# Patient Record
Sex: MALE | Race: WHITE | NOT HISPANIC OR LATINO | Employment: FULL TIME | URBAN - METROPOLITAN AREA
[De-identification: names, ages, dates, MRNs, and addresses within clinical notes are randomized per-mention and may not be internally consistent; named-entity substitution may affect disease eponyms.]

---

## 2017-02-13 DIAGNOSIS — F41.1 ANXIETY STATE: ICD-10-CM

## 2017-02-13 RX ORDER — LORAZEPAM 1 MG/1
1 TABLET ORAL EVERY 6 HOURS PRN
Qty: 60 TABLET | Refills: 1 | Status: SHIPPED | OUTPATIENT
Start: 2017-02-13 | End: 2017-04-11

## 2017-02-13 RX ORDER — CITALOPRAM HYDROBROMIDE 20 MG/1
20 TABLET ORAL DAILY
Qty: 30 TABLET | Refills: 5 | Status: SHIPPED | OUTPATIENT
Start: 2017-02-13 | End: 2017-05-05

## 2017-02-13 NOTE — TELEPHONE ENCOUNTER
CITALOPRAM 20MG     Last Written Prescription Date: 7/21/2016  Last Fill Quantity: 30, # refills: 4  Last Office Visit with [facility] primary care provider:  7/21/2016        Last PHQ-9 score on record=   PHQ-9 SCORE 7/21/2016   Total Score -   Total Score 5

## 2017-02-13 NOTE — TELEPHONE ENCOUNTER
LORAZEPAM 1MG      Last Written Prescription Date:  8/29/2016  Last Fill Quantity: 60,   # refills: 2  Last Office Visit with [facility], [facility] or  Health prescribing provider: 7/21/2016  Future Office visit:       Routing refill request to provider for review/approval because:  Drug not on the [facility], [facility] or  [facility] refill protocol or controlled substance

## 2017-02-13 NOTE — TELEPHONE ENCOUNTER
Patient takes this for anxiety, PHQ 9 not needed.   Prescription approved per [facility] Refill Protocol.  Zeina Mariano, RN  Triage Nurse

## 2017-02-13 NOTE — TELEPHONE ENCOUNTER
Rx refilled. In my outbasket.    Please call: He should schedule a f/u visit w/ me in the next 1-2 months.

## 2017-02-13 NOTE — TELEPHONE ENCOUNTER
Faxed rx to [facility] in [place] Fax# [phone]. Tried calling pt, number not in service.     Miesha Dias MA 2/13/2017 1:43 PM

## 2017-03-13 DIAGNOSIS — F51.01 PRIMARY INSOMNIA: ICD-10-CM

## 2017-03-13 RX ORDER — ZOLPIDEM TARTRATE 12.5 MG/1
12.5 TABLET, FILM COATED, EXTENDED RELEASE ORAL
Qty: 30 TABLET | Refills: 2 | Status: SHIPPED | OUTPATIENT
Start: 2017-03-13 | End: 2017-05-05

## 2017-03-13 NOTE — TELEPHONE ENCOUNTER
Script faxed to pharmacy.  Postponed until June. (due for OV in July per Provider)  Tyesha Jones LPN

## 2017-03-13 NOTE — TELEPHONE ENCOUNTER
zolpidem (AMBIEN CR) 12.5 MG CR tablet        Last Written Prescription Date: 12/12/16  Last Fill Quantity: 30,  # refills: 2   Last Office Visit with [facility], [facility] or [facility] prescribing provider: 07/21/16

## 2017-03-13 NOTE — LETTER
[address] [place] [address]  [phone]      June 13, 2017    Refugio Wood                                                                                                                                                       [id number] [facility] [address] [facility]   [place] [address]              Dear Refugio,    We have tried to reach you by phone with no success.  We have no current phone number for you.  It appears that you are due for an appointment.  Please call us at [phone] to schedule an appointment.      Sincerely,  Tay Hinojosa MD

## 2017-04-11 DIAGNOSIS — F41.1 ANXIETY STATE: ICD-10-CM

## 2017-04-12 RX ORDER — LORAZEPAM 1 MG/1
TABLET ORAL
Qty: 60 TABLET | Refills: 1 | Status: SHIPPED | OUTPATIENT
Start: 2017-04-12 | End: 2017-05-05

## 2017-04-12 NOTE — TELEPHONE ENCOUNTER
lorazepam      Last Written Prescription Date:  2-13-17  Last Fill Quantity: 60,   # refills: 1  Last Office Visit with [facility] or  Health prescribing provider: 7-21-16  Future Office visit:       Routing refill request to provider for review/approval because:  Drug not on the [facility], [facility] or  [facility] refill protocol or controlled substance  Tiffanie Barriga RN

## 2017-05-05 ENCOUNTER — OFFICE VISIT (OUTPATIENT)
Dept: PEDIATRICS | Facility: CLINIC | Age: 42
End: 2017-05-05
Payer: COMMERCIAL

## 2017-05-05 VITALS
SYSTOLIC BLOOD PRESSURE: 124 MMHG | OXYGEN SATURATION: 97 % | WEIGHT: 223 LBS | BODY MASS INDEX: 28.62 KG/M2 | DIASTOLIC BLOOD PRESSURE: 80 MMHG | TEMPERATURE: 98 F | HEART RATE: 70 BPM | HEIGHT: 74 IN

## 2017-05-05 DIAGNOSIS — F51.01 PRIMARY INSOMNIA: ICD-10-CM

## 2017-05-05 DIAGNOSIS — K58.0 IRRITABLE BOWEL SYNDROME WITH DIARRHEA: Primary | ICD-10-CM

## 2017-05-05 DIAGNOSIS — F41.1 ANXIETY STATE: ICD-10-CM

## 2017-05-05 PROCEDURE — 99214 OFFICE O/P EST MOD 30 MIN: CPT | Performed by: NURSE PRACTITIONER

## 2017-05-05 RX ORDER — CITALOPRAM HYDROBROMIDE 20 MG/1
20 TABLET ORAL DAILY
Qty: 90 TABLET | Refills: 3 | Status: SHIPPED | OUTPATIENT
Start: 2017-05-05 | End: 2017-11-29

## 2017-05-05 RX ORDER — ZOLPIDEM TARTRATE 12.5 MG/1
12.5 TABLET, FILM COATED, EXTENDED RELEASE ORAL
Qty: 30 TABLET | Refills: 5 | Status: SHIPPED | OUTPATIENT
Start: 2017-05-05 | End: 2017-06-09

## 2017-05-05 RX ORDER — LORAZEPAM 1 MG/1
1 TABLET ORAL EVERY 6 HOURS
Qty: 60 TABLET | Refills: 1 | Status: SHIPPED | OUTPATIENT
Start: 2017-05-05 | End: 2017-06-16

## 2017-05-05 RX ORDER — DIPHENOXYLATE HCL/ATROPINE 2.5-.025MG
1-2 TABLET ORAL 4 TIMES DAILY PRN
Qty: 40 TABLET | Refills: 5 | Status: SHIPPED | OUTPATIENT
Start: 2017-05-05 | End: 2017-12-23

## 2017-05-05 ASSESSMENT — ANXIETY QUESTIONNAIRES
2. NOT BEING ABLE TO STOP OR CONTROL WORRYING: SEVERAL DAYS
3. WORRYING TOO MUCH ABOUT DIFFERENT THINGS: SEVERAL DAYS
1. FEELING NERVOUS, ANXIOUS, OR ON EDGE: MORE THAN HALF THE DAYS
7. FEELING AFRAID AS IF SOMETHING AWFUL MIGHT HAPPEN: NOT AT ALL
6. BECOMING EASILY ANNOYED OR IRRITABLE: NOT AT ALL
GAD7 TOTAL SCORE: 7
IF YOU CHECKED OFF ANY PROBLEMS ON THIS QUESTIONNAIRE, HOW DIFFICULT HAVE THESE PROBLEMS MADE IT FOR YOU TO DO YOUR WORK, TAKE CARE OF THINGS AT HOME, OR GET ALONG WITH OTHER PEOPLE: SOMEWHAT DIFFICULT
5. BEING SO RESTLESS THAT IT IS HARD TO SIT STILL: SEVERAL DAYS

## 2017-05-05 ASSESSMENT — PATIENT HEALTH QUESTIONNAIRE - PHQ9: 5. POOR APPETITE OR OVEREATING: MORE THAN HALF THE DAYS

## 2017-05-05 NOTE — NURSING NOTE
"Chief Complaint   Patient presents with     Recheck Medication       Initial /80 (Cuff Size: Adult Regular)  Pulse 70  Temp 98  F (36.7  C) (Tympanic)  Ht 6' 2\" (1.88 m)  Wt 223 lb (101.2 kg)  SpO2 97%  BMI 28.63 kg/m2 Estimated body mass index is 28.63 kg/(m^2) as calculated from the following:    Height as of this encounter: 6' 2\" (1.88 m).    Weight as of this encounter: 223 lb (101.2 kg).  Medication Reconciliation: complete   Kiera Posada CMA    "

## 2017-05-05 NOTE — PROGRESS NOTES
"  SUBJECTIVE:                                                    Refugio Wood is a 41 year old male who presents to clinic today for the following health issues:    Medication Followup of Lorazepam. Citalopram, Ambien, Diphenoxylate-Atropine (Lomotil)    Taking Medication as prescribed: yes - some are as needed, would like ot start reducing/weaning off meds - would like to discuss    Side Effects:  None    Medication Helping Symptoms:  Yes - feels like he may need less     ROS: const/psych/gi otherwise negative     OBJECTIVE:  /80 (Cuff Size: Adult Regular)  Pulse 70  Temp 98  F (36.7  C) (Tympanic)  Ht 6' 2\" (1.88 m)  Wt 223 lb (101.2 kg)  SpO2 97%  BMI 28.63 kg/m2  CONSTITUTIONAL: Alert, well-nourished, well-groomed, NAD  RESP: Lungs CTA. No wheeze, rhonchi, rales.  CV: HRRR S1 S2 No MRG. No peripheral edema  PSYCH: Bright affect. Appropriate mentation and speech.       ASSESSMENT/PLAN:  (K58.0) Irritable bowel syndrome with diarrhea  (primary encounter diagnosis)  Comment: Unchanged. Takes about once per week.  Plan: diphenoxylate-atropine (LOMOTIL) 2.5-0.025 MG         per tablet            (F41.1) Anxiety state  Comment: Insomnia and anxiety related to insomnia. Daily anxiety is controlled. Discussed with patient that he has worse than your typical insomnia and that there are short and long term risks to ambien and lorazepam. Recommend he follow up with sleep therapist and sleep doc.   Plan: LORazepam (ATIVAN) 1 MG tablet, citalopram         (CELEXA) 20 MG tablet, SLEEP EVALUATION &         MANAGEMENT REFERRAL - ADULT        No further refills from me prior to seeing sleep doc.        Reinforced good sleep hygiene.     (F51.01) Primary insomnia  Plan: zolpidem (AMBIEN CR) 12.5 MG CR tablet, SLEEP         EVALUATION & MANAGEMENT REFERRAL - ADULT              Alessandra Leonardo, KEVINP-DNP.        "

## 2017-05-05 NOTE — MR AVS SNAPSHOT
After Visit Summary   5/5/2017    Refugio Wood    MRN: [id number]           Patient Information     Date Of Birth          1975        Visit Information        Provider Department      5/5/2017 12:00 PM Alessandra Leonardo APRN CNP [facility]        Today's Diagnoses     Irritable bowel syndrome with diarrhea    -  1    Anxiety state        Primary insomnia          Care Instructions    1. Sleep doc [phone]        Follow-ups after your visit        Additional Services     SLEEP EVALUATION & MANAGEMENT REFERRAL - ADULT       Please be aware that coverage of these services is subject to the terms and limitations of your health insurance plan.  Call member services at your health plan with any benefit or coverage questions.    [phone]    Please bring the following to your appointment:    >>   List of current medications   >>   This referral request   >>   Any documents/labs given to you for this referral    [facility] Neurology [facility] [phone]                  Future tests that were ordered for you today     Open Future Orders        Priority Expected Expires Ordered    SLEEP EVALUATION & MANAGEMENT REFERRAL - ADULT Routine  5/5/2018 5/5/2017            Who to contact     If you have questions or need follow up information about today's clinic visit or your schedule please [facility] directly at [phone].  Normal or non-critical lab and imaging results will be communicated to you by MyChart, letter or phone within 4 business days after the clinic has received the results. If you do not hear from us within 7 days, please contact the clinic through MyChart or phone. If you have a critical or abnormal lab result, we will notify you by phone as soon as possible.  Submit refill requests through [employer] or call your pharmacy and they will forward the refill request to us. Please allow 3 business days for your refill to be  "completed.          Additional Information About Your Visit        [email or web address] Information     [employer] lets you send messages to your doctor, view your test results, renew your prescriptions, schedule appointments and more. To sign up, go to [email or web address] . Click on \"Log in\" on the left side of the screen, which will take you to the Welcome page. Then click on \"Sign up Now\" on the right side of the page.     You will be asked to enter the access code listed below, as well as some personal information. Please follow the directions to create your username and password.     Your access code is: [email or web address]  Expires: 8/3/2017 12:28 PM     Your access code will  in 90 days. If you need help or a new code, please call your [place] clinic or [phone].        Care EveryWhere ID     This is your Care EveryWhere ID. This could be used by other organizations to access your [place] medical records  [id number]        Your Vitals Were     Pulse Temperature Height Pulse Oximetry BMI (Body Mass Index)       70 98  F (36.7  C) (Tympanic) 6' 2\" (1.88 m) 97% 28.63 kg/m2        Blood Pressure from Last 3 Encounters:   17 124/80   16 112/68   16 122/78    Weight from Last 3 Encounters:   17 223 lb (101.2 kg)   16 225 lb (102.1 kg)   16 228 lb (103.4 kg)                 Today's Medication Changes          These changes are accurate as of: 17 12:28 PM.  If you have any questions, ask your nurse or doctor.               These medicines have changed or have updated prescriptions.        Dose/Directions    diphenoxylate-atropine 2.5-0.025 MG per tablet   Commonly known as:  LOMOTIL   This may have changed:    - medication strength  - how much to take  - when to take this  - reasons to take this   Used for:  Irritable bowel syndrome with diarrhea   Changed by:  Alessandra Leonardo APRN CNP        Dose:  1-2 tablet   Take 1-2 tablets by mouth 4 times daily as needed   Quantity:  " 40 tablet   Refills:  5       * LORazepam 1 MG tablet   Commonly known as:  ATIVAN   This may have changed:  Another medication with the same name was changed. Make sure you understand how and when to take each.   Used for:  Anxiety state   Changed by:  Uzair Watson MD        Dose:  1 mg   Take 1 tablet (1 mg) by mouth nightly as needed for anxiety   Quantity:  30 tablet   Refills:  2       * LORazepam 1 MG tablet   Commonly known as:  ATIVAN   This may have changed:  See the new instructions.   Used for:  Anxiety state   Changed by:  Alessandra Leonardo APRN CNP        Dose:  1 mg   Take 1 tablet (1 mg) by mouth every 6 hours   Quantity:  60 tablet   Refills:  1       * Notice:  This list has 2 medication(s) that are the same as other medications prescribed for you. Read the directions carefully, and ask your doctor or other care provider to review them with you.         Where to get your medicines      These medications were sent to [facility] [address] [place] [address] AT [facility] [address] [address]     Phone:  [phone]     citalopram 20 MG tablet         Some of these will need a paper prescription and others can be bought over the counter.  Ask your nurse if you have questions.     Bring a paper prescription for each of these medications     diphenoxylate-atropine 2.5-0.025 MG per tablet    LORazepam 1 MG tablet    zolpidem 12.5 MG CR tablet                Primary Care Provider Office Phone # Fax #    Tay Hinojosa [phone]       [address] [place] [address]        Thank you!     Thank you for choosing [facility]  for your care. Our goal is always to provide you with excellent care. Hearing back from our patients is one way we can continue to improve our services. Please take a few minutes to complete the written survey that you may receive in the mail after your  visit with us. Thank you!             Your Updated Medication List - Protect others around you: Learn how to safely use, store and throw away your medicines at www.disposemymeds.org.          This list is accurate as of: 5/5/17 12:28 PM.  Always use your most recent med list.                   Brand Name Dispense Instructions for use    citalopram 20 MG tablet    celeXA    90 tablet    Take 1 tablet (20 mg) by mouth daily Take 1/2 tablet (10 mg) for 1-2 weeks, then increase to 1 tablet orally daily       diphenoxylate-atropine 2.5-0.025 MG per tablet    LOMOTIL    40 tablet    Take 1-2 tablets by mouth 4 times daily as needed       * LORazepam 1 MG tablet    ATIVAN    30 tablet    Take 1 tablet (1 mg) by mouth nightly as needed for anxiety       * LORazepam 1 MG tablet    ATIVAN    60 tablet    Take 1 tablet (1 mg) by mouth every 6 hours       zolpidem 12.5 MG CR tablet    AMBIEN CR    30 tablet    Take 1 tablet (12.5 mg) by mouth nightly as needed for sleep       * Notice:  This list has 2 medication(s) that are the same as other medications prescribed for you. Read the directions carefully, and ask your doctor or other care provider to review them with you.

## 2017-05-06 ASSESSMENT — ANXIETY QUESTIONNAIRES: GAD7 TOTAL SCORE: 7

## 2017-05-06 ASSESSMENT — PATIENT HEALTH QUESTIONNAIRE - PHQ9: SUM OF ALL RESPONSES TO PHQ QUESTIONS 1-9: 8

## 2017-05-24 ENCOUNTER — TELEPHONE (OUTPATIENT)
Dept: PEDIATRICS | Facility: CLINIC | Age: 42
End: 2017-05-24

## 2017-05-24 NOTE — TELEPHONE ENCOUNTER
I helped schedule this patient with the [facility] but I noticed his order mentioned going to the neurology clinic. I brought this up to the patient and he wasn't sure but considering it was a sleep evaluation order we still scheduled him. Please call him to verify if he was supposed to be seeing a neurologist or a sleep doctor.     Thank you,  Aylin KATE   Central Scheduler

## 2017-05-25 ENCOUNTER — TELEPHONE (OUTPATIENT)
Dept: PEDIATRICS | Facility: CLINIC | Age: 42
End: 2017-05-25

## 2017-05-25 NOTE — TELEPHONE ENCOUNTER
This writer only see notes for sleep evaluation per last OV notes on 5/5. Route to station B for further.     //Susan Blair MA// May 25, 2017 9:37 AM

## 2017-05-25 NOTE — TELEPHONE ENCOUNTER
Pt. Informed of note below. No further questions at this time as he has an appointment already made for the sleep clinic.     Michelle Skinner RN, BSN, PHN

## 2017-06-08 DIAGNOSIS — F51.01 PRIMARY INSOMNIA: ICD-10-CM

## 2017-06-09 DIAGNOSIS — F51.01 PRIMARY INSOMNIA: ICD-10-CM

## 2017-06-09 RX ORDER — ZOLPIDEM TARTRATE 12.5 MG/1
12.5 TABLET, FILM COATED, EXTENDED RELEASE ORAL
Qty: 30 TABLET | Refills: 5 | Status: SHIPPED | OUTPATIENT
Start: 2017-06-09 | End: 2017-11-29

## 2017-06-09 RX ORDER — ZOLPIDEM TARTRATE 12.5 MG/1
TABLET, FILM COATED, EXTENDED RELEASE ORAL
Qty: 30 TABLET | Refills: 0 | OUTPATIENT
Start: 2017-06-09

## 2017-06-09 NOTE — TELEPHONE ENCOUNTER
Written 6/9/17    Disp Refills Start End MICHELLE   zolpidem (AMBIEN CR) 12.5 MG CR tablet 30 tablet 5 6/9/2017

## 2017-06-12 RX ORDER — ZOLPIDEM TARTRATE 12.5 MG/1
TABLET, FILM COATED, EXTENDED RELEASE ORAL
Qty: 30 TABLET | Refills: 0 | COMMUNITY
Start: 2017-06-12

## 2017-06-12 NOTE — TELEPHONE ENCOUNTER
No refills authorized.   Patient needs appointment with sleep doc for further evaluation.   Reynaldo Lauren PA-C

## 2017-06-13 NOTE — TELEPHONE ENCOUNTER
We do not have any current phone number for pt. Letter mailed to pt to call us to schedule an appointment.Zeina Lawton LPN

## 2017-07-08 DIAGNOSIS — F41.1 ANXIETY STATE: ICD-10-CM

## 2017-07-09 NOTE — TELEPHONE ENCOUNTER
LORazepam (ATIVAN) 1 MG tablet      Last Written Prescription Date:  06/16/2017  Last Fill Quantity: 60,   # refills: 0  Last Office Visit with [facility] or [facility] prescribing provider: 05/05/2017  Future Office visit:       Routing refill request to provider for review/approval because:  Drug not on the [facility], [facility] or [facility] refill protocol or controlled substance

## 2017-07-12 ENCOUNTER — OFFICE VISIT (OUTPATIENT)
Dept: SLEEP MEDICINE | Facility: CLINIC | Age: 42
End: 2017-07-12
Payer: COMMERCIAL

## 2017-07-12 VITALS
HEART RATE: 60 BPM | SYSTOLIC BLOOD PRESSURE: 121 MMHG | OXYGEN SATURATION: 99 % | BODY MASS INDEX: 28.25 KG/M2 | HEIGHT: 75 IN | WEIGHT: 227.2 LBS | DIASTOLIC BLOOD PRESSURE: 76 MMHG

## 2017-07-12 DIAGNOSIS — F51.04 PSYCHOPHYSIOLOGIC INSOMNIA: ICD-10-CM

## 2017-07-12 DIAGNOSIS — F41.1 ANXIETY STATE: ICD-10-CM

## 2017-07-12 DIAGNOSIS — G25.81 RESTLESS LEG: ICD-10-CM

## 2017-07-12 DIAGNOSIS — G47.21 DELAYED SLEEP PHASE SYNDROME: Primary | ICD-10-CM

## 2017-07-12 PROCEDURE — 99204 OFFICE O/P NEW MOD 45 MIN: CPT | Performed by: PHYSICIAN ASSISTANT

## 2017-07-12 RX ORDER — GABAPENTIN 100 MG/1
CAPSULE ORAL
Qty: 90 CAPSULE | Refills: 1 | Status: SHIPPED | OUTPATIENT
Start: 2017-07-12 | End: 2017-11-29

## 2017-07-12 RX ORDER — LORAZEPAM 1 MG/1
TABLET ORAL
Qty: 60 TABLET | Refills: 1 | Status: SHIPPED | OUTPATIENT
Start: 2017-07-12 | End: 2017-09-05

## 2017-07-12 NOTE — NURSING NOTE
"Chief Complaint   Patient presents with     Sleep Problem     sleeping issues       Initial /76  Pulse 60  Ht 1.905 m (6' 3\")  Wt 103.1 kg (227 lb 3.2 oz)  SpO2 99%  BMI 28.4 kg/m2 Estimated body mass index is 28.4 kg/(m^2) as calculated from the following:    Height as of this encounter: 1.905 m (6' 3\").    Weight as of this encounter: 103.1 kg (227 lb 3.2 oz).  Medication Reconciliation: complete   Neck 43cm  ESS 0/24  Jennyfer Franco MA      "

## 2017-07-12 NOTE — PATIENT INSTRUCTIONS
Instructions for treating Delayed Sleep Phase Syndrome:    Delayed Sleep Phase Syndrome (DSPS) means that your body's internal timing is set late compared to the 24 hour day. Therefore, it is often difficult to get up on time for work in the morning and sometimes difficult to fall asleep on time, in order to get enough sleep. People with DSPS often tend to like to stay up late on weekends and sleep in until between 10 AM and noon, sometimes even later.This is actually a bad habit that will perpetuate the problem. It reinforces your body's tendency to be on that later schedule.    You should go to bed when you are sleepy and ready to sleep. During this entire process, you should not engage in activities that may make it worse, such as watching TV in bed, leaving the TV on all night, drinking any caffeine 6 hours before bed or exercising 1-2 hours before bed.     Start taking Melatonin, 1 mg tablet 3-5 hours before the time that you fall asleep on average (not your desired bedtime or time that you get in bed, but the time you normally fall asleep on your own).     Upon awakening, get exposure to sun-light for about 30-45 minutes. You do not need to look at the sun, in fact, this is dangerous. Reading the paper with the sun shining on you is adequate.  Alternatively, you may use a Seasonal Affective Disorder Lamp (intensity 10,000 Lux) instead of the sun. The lamp should be positioned 1-2 arms lengths away from you. They lamps are sold at Home [employer] Companies such as [employer] or [employer]. A prescription can be written to get insurance coverage in some cases. They are also sold on [employer].    Using the light and melatonin should help march your internal clock (known as your circadian rhythms) gradually earlier. As your bedtime advances, remember to take your melatonin earlier, keeping it 5 hours before your fall asleep time.    Avoid naps and sleeping in because sleeping during the day will delay  your body's clock and you will have to start from scratch.     More information about light therapy:  If the cost of any light box is too much, you can also purchase a compact fluorescent all spectrum light bulb at a local hardware store for about $8.  The most commonly available bulb is 1400 lumen.  You would need two of these positioned within 1 meter of yourself to be equivalent to 2,500 lux.  The bulbs can be placed in a standard light fixture.  Additionally, they can be placed in a mountable fixture that is used in greenhouses.  Mountable fixtures are also available at hardware stores for about $9.  Do not look directly at the light.  If you have any concerns regarding the safety of bright light therapy for you, it is recommended that you consult an ophthalmologist before using a light box.  If you have a condition that makes your eyes very sensitive to light, macular degeneration, a family history of such problems, or diabetic changes to your eyes, consult an ophthalmologist before using a light box. If you have anxiety disorder and have an increase in anxiety discontinue use.     General recommendations for sleep problems (Insomnia)  Allow 2-4 weeks to see results     Establish a regular sleep schedule    Most people only need 7-8 hours of sleep.  Don't be in bed longer than you need     to sleep or you will end up spending more time awake in bed. This trains your    brain to think of the bed as a place to not sleep.  Go to bed at same time each night   Get up at same time each day - Set an alarm everyday (even weekends). This is one of    the most important tips. It prevents you from relying on your insomnia to get you    up on time for your day. That actually reinforces insomnia. It also will help your    body get into a pattern where you start feeling tired at a consistent time each    night.  The body functions best when you keep a consistent routine.  Avoid sleeping-in and napping. Anytime you sleep  during the day, you will be less tired at    night. You may be tired enough to fall asleep, but you will wake more in the    middle of the night because you will have met your sleep need before the night is    done.   Cut down time in bed (if not asleep, get up)- Use your bed only for sleep and sex    Anytime you spend time in bed doing activities other than sleep (reading,    watching TV, working, playing on the computer or phone, or even just laying in    bed trying to sleep), you are training  your brain to think of the bed as a place to    do activities other than sleep. If you are not falling asleep within 20-30 minutes,    get out of bed. While out of bed, avoid bright lights. Avoid work or chores. Being    productive in the middle of the night reinforces waking up at night. Find relaxing,    not particularly entertaining activities like reading, listening to music, or relaxation    exercises. Go back to bed if you start feeling groggy, or after about 30 minutes,    even if not feeling very tired. Sometimes, just getting out of bed stops the pattern    of getting frustrated about laying in bed not sleeping, and that can help you fall    asleep.   Avoid trying to force yourself to sleep- sleep is not like everything else. The harder you    work at most things, the more you can accomplish. The harder you work at    sleep, the less you will sleep.     Make the bedroom comfortable - quiet, dark and cool are better. Consider ear plugs    (silicon). Use dark blinds or wear an eye mask if needed     Make a relaxing routine prior to bedtime  Relaxation exercises:   Progressive muscle relaxation: Relax each muscle group individually    Begin with your feet, flex, then relax. Try to imagine your feet feeling heavy and sinking into the bed. Move to your calves, do the same thing. Work through each muscle group toward your head.    Relaxing Mental Imagery: Try to imagine a trip that you took and found relaxing, or imagine  a day at the beach. Try to walk yourself through the day in your mind as if you were dreaming it. Try to imagine sensing the different experiences, such as feeling sand between your toes, the heat of the sun on your skin, seeing the waves crashing the shore, the smell of the salt water, etc.     Deal with your worries before bedtime    Set aside a worry time around dinner time for 10-15 minutes. Write down the    things that are on your mind. Plan time in the coming days to address those    issues. Brainstorm ideas on how you will deal with them. Try to identify issues    that are out of your control, and try to let those issues go.  Listen to relaxation tapes   Classical Music or Nature sounds   Back Massage   Get regular exercise each day (at least 1-2 hours before bedtime)   Take medications only as directed   Eat a light bedtime snack or warm drink   Warm milk   Warm herbal tea (non-caffeinated)       Things to avoid   No overstimulating activities just before bed   No competitive games before bedtime   No exciting television programs before bedtime   Avoid caffeine after lunchtime   Avoid chocolate   Do not use alcohol to induce sleep (worsens Insomnia)   Do not take someone else's sleeping pills   Do not look at the clock when awakening   Do not turn on light when getting up to use bathroom, use a nightlight     Online Programs     www.SHUTi.me (pronounced shut eye). There is a fee for this program. Enter the code  [place]  if you decide to enroll in this program.      www.sleepIO.com (pronounced sleep ee oh). There is a fee for this program. Enter the code  [place]  if you decide to enroll in this program.     Suggested Resources  Insomnia Treatment Books     Overcoming Insomnia by Matthew Stone and Valeria Chappell (2008)    No More Sleepless Nights by John Jessica and Myranda Nunes (1996)    Say [place] to Insomnia by Beny Dowling (2009)    The Insomnia Workbook by Nettie Arredondo and Hussain  Lois (2009)    The Insomnia Answer by Rosalino Victor and Shakir Weston (2006)      Stress Management and Relaxation Books    The Relaxation and Stress Reduction Workbook by Marilyn Renner, Pippa Wilson and Anish Vincent (2008)    Stress Management Workbook: Techniques and Self-Assessment Procedures by Leigha Marsh and Jabari Quintero (1997)    A Mindfulness-Based Stress Reduction Workbook by Santana Mckeon and Caryn Mayes (2010)    The Complete Stress Management Workbook by Vinny Jolley, Ramon Rodriguez and Pierre Gallardo (1996)    Assert Yourself by Melissa Isaac and Jaylen Isaac (1977)    Relaxation Resources for Computer Download   These websites offer resources to help you relax. This list is for information only. [facility] is not responsible for the quality of services or the actions of any person or organization.  Progressive Muscle Relaxation (PMR):     http://www.[facility]/progressive-muscle-relaxation-exercise.html     http://studentsupport.[email or web address]/counseling/resources/[email or web address]/relaxation-and-stress-management/   Deep Breathing Exercises:    http://www.[facility]/breathing-awareness.html     Meditation:     www.[employer]    www.[employer]itation-site.com You may have to pay for some of these resources.    Guided Imagery:    http://www.[facility]/guided-imagery-scripts.html     http://[employer]/library/tavljuzngd-lokozc-qrezbyz/     Counseling / Behavioral Health  [facility]  Visit [email or web address] or call [phone] to find a clinic close to you.  Or call [phone] for [facility].

## 2017-07-12 NOTE — MR AVS SNAPSHOT
After Visit Summary   7/12/2017    Refugio Wood    MRN: [id number]           Patient Information     Date Of Birth          1975        Visit Information        Provider Department      7/12/2017 11:00 AM Goltz, Bennett Ezra, PA-C [facility]        Today's Diagnoses     Delayed sleep phase syndrome    -  1    Restless leg        Psychophysiologic insomnia        Anxiety state          Care Instructions    Instructions for treating Delayed Sleep Phase Syndrome:    Delayed Sleep Phase Syndrome (DSPS) means that your body's internal timing is set late compared to the 24 hour day. Therefore, it is often difficult to get up on time for work in the morning and sometimes difficult to fall asleep on time, in order to get enough sleep. People with DSPS often tend to like to stay up late on weekends and sleep in until between 10 AM and noon, sometimes even later.This is actually a bad habit that will perpetuate the problem. It reinforces your body's tendency to be on that later schedule.    You should go to bed when you are sleepy and ready to sleep. During this entire process, you should not engage in activities that may make it worse, such as watching TV in bed, leaving the TV on all night, drinking any caffeine 6 hours before bed or exercising 1-2 hours before bed.     Start taking Melatonin, 1 mg tablet 3-5 hours before the time that you fall asleep on average (not your desired bedtime or time that you get in bed, but the time you normally fall asleep on your own).     Upon awakening, get exposure to sun-light for about 30-45 minutes. You do not need to look at the sun, in fact, this is dangerous. Reading the paper with the sun shining on you is adequate.  Alternatively, you may use a Seasonal Affective Disorder Lamp (intensity 10,000 Lux) instead of the sun. The lamp should be positioned 1-2 arms lengths away from you. They lamps are sold at [facility] [employer] such as [facility],  [employer] or [facility]. A prescription can be written to get insurance coverage in some cases. They are also sold on [employer].com.    Using the light and melatonin should help march your internal clock (known as your circadian rhythms) gradually earlier. As your bedtime advances, remember to take your melatonin earlier, keeping it 5 hours before your fall asleep time.    Avoid naps and sleeping in because sleeping during the day will delay your body's clock and you will have to start from scratch.     More information about light therapy:  If the cost of any light box is too much, you can also purchase a compact fluorescent all spectrum light bulb at a local hardware store for about $8.  The most commonly available bulb is 1400 lumen.  You would need two of these positioned within 1 meter of yourself to be equivalent to 2,500 lux.  The bulbs can be placed in a standard light fixture.  Additionally, they can be placed in a mountable fixture that is used in greenhouses.  Mountable fixtures are also available at hardware stores for about $9.  Do not look directly at the light.  If you have any concerns regarding the safety of bright light therapy for you, it is recommended that you consult an ophthalmologist before using a light box.  If you have a condition that makes your eyes very sensitive to light, macular degeneration, a family history of such problems, or diabetic changes to your eyes, consult an ophthalmologist before using a light box. If you have anxiety disorder and have an increase in anxiety discontinue use.     General recommendations for sleep problems (Insomnia)  Allow 2-4 weeks to see results     Establish a regular sleep schedule    Most people only need 7-8 hours of sleep.  Don't be in bed longer than you need     to sleep or you will end up spending more time awake in bed. This trains your    brain to think of the bed as a place to not sleep.  Go to bed at same time each night   Get up at same  time each day - Set an alarm everyday (even weekends). This is one of    the most important tips. It prevents you from relying on your insomnia to get you    up on time for your day. That actually reinforces insomnia. It also will help your    body get into a pattern where you start feeling tired at a consistent time each    night.  The body functions best when you keep a consistent routine.  Avoid sleeping-in and napping. Anytime you sleep during the day, you will be less tired at    night. You may be tired enough to fall asleep, but you will wake more in the    middle of the night because you will have met your sleep need before the night is    done.   Cut down time in bed (if not asleep, get up)- Use your bed only for sleep and sex    Anytime you spend time in bed doing activities other than sleep (reading,    watching TV, working, playing on the computer or phone, or even just laying in    bed trying to sleep), you are training  your brain to think of the bed as a place to    do activities other than sleep. If you are not falling asleep within 20-30 minutes,    get out of bed. While out of bed, avoid bright lights. Avoid work or chores. Being    productive in the middle of the night reinforces waking up at night. Find relaxing,    not particularly entertaining activities like reading, listening to music, or relaxation    exercises. Go back to bed if you start feeling groggy, or after about 30 minutes,    even if not feeling very tired. Sometimes, just getting out of bed stops the pattern    of getting frustrated about laying in bed not sleeping, and that can help you fall    asleep.   Avoid trying to force yourself to sleep- sleep is not like everything else. The harder you    work at most things, the more you can accomplish. The harder you work at    sleep, the less you will sleep.     Make the bedroom comfortable - quiet, dark and cool are better. Consider ear plugs    (silicon). Use dark blinds or wear an eye  mask if needed     Make a relaxing routine prior to bedtime  Relaxation exercises:   Progressive muscle relaxation: Relax each muscle group individually    Begin with your feet, flex, then relax. Try to imagine your feet feeling heavy and sinking into the bed. Move to your calves, do the same thing. Work through each muscle group toward your head.    Relaxing Mental Imagery: Try to imagine a trip that you took and found relaxing, or imagine a day at the beach. Try to walk yourself through the day in your mind as if you were dreaming it. Try to imagine sensing the different experiences, such as feeling sand between your toes, the heat of the sun on your skin, seeing the waves crashing the shore, the smell of the salt water, etc.     Deal with your worries before bedtime    Set aside a worry time around dinner time for 10-15 minutes. Write down the    things that are on your mind. Plan time in the coming days to address those    issues. Brainstorm ideas on how you will deal with them. Try to identify issues    that are out of your control, and try to let those issues go.  Listen to relaxation tapes   Classical Music or Nature sounds   Back Massage   Get regular exercise each day (at least 1-2 hours before bedtime)   Take medications only as directed   Eat a light bedtime snack or warm drink   Warm milk   Warm herbal tea (non-caffeinated)       Things to avoid   No overstimulating activities just before bed   No competitive games before bedtime   No exciting television programs before bedtime   Avoid caffeine after lunchtime   Avoid chocolate   Do not use alcohol to induce sleep (worsens Insomnia)   Do not take someone else's sleeping pills   Do not look at the clock when awakening   Do not turn on light when getting up to use bathroom, use a nightlight     Online Programs     www.SHUTi.me (pronounced shut eye). There is a fee for this program. Enter the code  [employer]  if you decide to enroll in this  program.      www.sleepIO.com (pronounced sleep ee oh). There is a fee for this program. Enter the code  [facility]  if you decide to enroll in this program.     Suggested Resources  Insomnia Treatment Books     Overcoming Insomnia by Matthew Stone and Valeria Chappell (2008)    No More Sleepless Nights by John Jessica and Myranda Nunes (1996)    Say Lambert to Insomnia by Beny Dowling (2009)    The Insomnia Workbook by Ntetie Arredondo and Hussain Abreu (2009)    The Insomnia Answer by Rosalino Victor and Shakir Weston (2006)      Stress Management and Relaxation Books    The Relaxation and Stress Reduction Workbook by Marilyn Renner, Pippa Wilson and Anish Vincent (2008)    Stress Management Workbook: Techniques and Self-Assessment Procedures by Leigha Marsh and Jabari Quintero (1997)    A Mindfulness-Based Stress Reduction Workbook by Santana Mckeon and Caryn Mayes (2010)    The Complete Stress Management Workbook by Vinny Jolley, Ramon Rodriguez and Pierre Gallardo (1996)    Assert Yourself by Melissa Isaac and Jaylen Isaac (1977)    Relaxation Resources for Computer Download   These websites offer resources to help you relax. This list is for information only. [facility] is not responsible for the quality of services or the actions of any person or organization.  Progressive Muscle Relaxation (PMR):     http://www.[employer]/progressive-muscle-relaxation-exercise.html     http://studentsupport.[facility]/counseling/resources/self-help/relaxation-and-stress-management/   Deep Breathing Exercises:    http://www.[employer]/breathing-awareness.html     Meditation:     www.[employer]    www.theISISguided-meditation-site.com You may have to pay for some of these resources.    Guided Imagery:    http://www.[employer]/guided-imagery-scripts.html     http://[employer]/library/atvvdwmupy-dueezs-cgmzbdt/     Counseling / Behavioral  Health  [facility]  Visit [email or web address] or call [phone] to find a clinic close to you.  Or call [phone] for [facility].              Follow-ups after your visit        Follow-up notes from your care team     Return in about 1 month (around 8/12/2017) for Circadian rhythm management.      Your next 10 appointments already scheduled     Aug 09, 2017  1:00 PM CDT   Return Sleep Patient with Bennett Ezra Goltz, PA-C   [facility] ([facility])    [address] [place] [id number] [phone]              Who to contact     If you have questions or need follow up information about today's clinic visit or your schedule please contact [facility] directly at [phone].  Normal or non-critical lab and imaging results will be communicated to you by MyChart, letter or phone within 4 business days after the clinic has received the results. If you do not hear from us within 7 days, please contact the clinic through [employer] or phone. If you have a critical or abnormal lab result, we will notify you by phone as soon as possible.  Submit refill requests through [employer] or call your pharmacy and they will forward the refill request to us. Please allow 3 business days for your refill to be completed.          Additional Information About Your Visit        MyChart Information     [facility] gives you secure access to your electronic health record. If you see a primary care provider, you can also send messages to your care team and make appointments. If you have questions, please call your primary care clinic.  If you do not have a primary care provider, please call [phone] and they will assist you.        Care EveryWhere ID     This is your Care EveryWhere ID. This could be used by other organizations to access your [facility] medical records  [id number]        Your Vitals Were     Pulse Height  "Pulse Oximetry BMI (Body Mass Index)          60 1.905 m (6' 3\") 99% 28.4 kg/m2         Blood Pressure from Last 3 Encounters:   07/12/17 121/76   05/05/17 124/80   07/21/16 112/68    Weight from Last 3 Encounters:   07/12/17 103.1 kg (227 lb 3.2 oz)   05/05/17 101.2 kg (223 lb)   07/21/16 102.1 kg (225 lb)              Today, you had the following     No orders found for display         Today's Medication Changes          These changes are accurate as of: 7/12/17 12:34 PM.  If you have any questions, ask your nurse or doctor.               Start taking these medicines.        Dose/Directions    gabapentin 100 MG capsule   Commonly known as:  NEURONTIN   Used for:  Restless leg   Started by:  Goltz, Bennett Ezra, PA-C        Take 1 capsule by mouth in the evening. May increase by 1 capsule every 5 days as needed.   Quantity:  90 capsule   Refills:  1            Where to get your medicines      These medications were sent to [facility] [address] [place] [address] AT [facility] OF [facility] [address] [address]     Phone:  [phone]     gabapentin 100 MG capsule                Primary Care Provider Office Phone # Fax #    Tay Hinojosa [phone]       [facility] [address] [place] [address] [address] [place] [address]        Equal Access to Services     GÓMEZ CARRANZA AH: Karina Stroud, walouiseda lulucretia, qaybta kaalmaremy powell, cristel rashid. So [facility] [phone].    ATENCIÓN: Si habla español, tiene a ng disposición servicios gratuitos de asistencia lingüística. Llame al [phone].    We comply with applicable federal civil rights laws and [place] laws. We do not discriminate on the basis of race, color, national origin, age, disability sex, sexual orientation or gender identity.            Thank you!     Thank you for choosing [facility]  for your care. Our goal is always to " provide you with excellent care. Hearing back from our patients is one way we can continue to improve our services. Please take a few minutes to complete the written survey that you may receive in the mail after your visit with us. Thank you!             Your Updated Medication List - Protect others around you: Learn how to safely use, store and throw away your medicines at www.disposemymeds.org.          This list is accurate as of: 7/12/17 12:34 PM.  Always use your most recent med list.                   Brand Name Dispense Instructions for use Diagnosis    citalopram 20 MG tablet    celeXA    90 tablet    Take 1 tablet (20 mg) by mouth daily Take 1/2 tablet (10 mg) for 1-2 weeks, then increase to 1 tablet orally daily    Anxiety state       diphenoxylate-atropine 2.5-0.025 MG per tablet    LOMOTIL    40 tablet    Take 1-2 tablets by mouth 4 times daily as needed    Irritable bowel syndrome with diarrhea       gabapentin 100 MG capsule    NEURONTIN    90 capsule    Take 1 capsule by mouth in the evening. May increase by 1 capsule every 5 days as needed.    Restless leg       * LORazepam 1 MG tablet    ATIVAN    30 tablet    Take 1 tablet (1 mg) by mouth nightly as needed for anxiety    Anxiety state       * LORazepam 1 MG tablet    ATIVAN    60 tablet    TAKE 1 TABLET BY MOUTH EVERY 6 HOURS AS NEEDED FOR ANXIETY    Anxiety state       zolpidem 12.5 MG CR tablet    AMBIEN CR    30 tablet    Take 1 tablet (12.5 mg) by mouth nightly as needed for sleep    Primary insomnia       * Notice:  This list has 2 medication(s) that are the same as other medications prescribed for you. Read the directions carefully, and ask your doctor or other care provider to review them with you.

## 2017-07-12 NOTE — PROGRESS NOTES
Sleep Consultation:    Date on this visit: 7/12/2017    Refugio Wood is sent by Alessandra Leonardo CNP for a sleep consultation regarding insomnia and restless legs.    Primary Physician: Tay Hinojosa     Refugio Wood reports insomnia and restless legs for 8 years. His medical history is significant for anxiety and IBS.    He had a sleep study at [facility] in 2011. His AHI was 3.9/hr (4.2/hr supine) and RDI was 11/hr. His O2 mo was 87%. His PLM index was 35.5/hr. Many of the PLMs that were causing arousals were scored as RERAs. He weighed 230 pounds at that time.     Refugio goes to sleep at 10:00 PM during the week. He feels his highest energy at 10-11 PM.  He wakes up at 6:00 AM with an alarm. He often feels exhausted when he wakes. He falls asleep in 3-4 hours.  Refugio has difficulty falling asleep.  He takes ZzzQuil and melatonin around 8-10 PM. He takes zolpidem 12.5 mg CR on work days. He usually does not take it on weekends or in the summer. On days when he is still up by 3-4 AM, he may take a second pill. It helps somewhat. He has been on zolpidem for 3-4 years. He may have tried trazodone before that and it did not help. He wakes up 0-1 times a night for 5 minutes before falling back to sleep.  Refugio wakes up to go to the bathroom and uncertain reasons.  On weekends, Refugio goes to sleep at 12-1:00 AM.  He wakes up at 10:00 AM to noon without an alarm. He does feel better, but not great. He falls asleep in 1-3 hours.  Patient gets an average of 5-6 hours of sleep per week night and 8-10 hours on weekends. He almost always falls asleep between 2-4 AM.     Patient does use electronics in bed (works on his laptop into the evening), worry in bed about work, read in bed and does not watch TV in bed. He finds reading helps distract his mind better than work. Working on his computer in the evening seems to worsen his anxiety. He started having anxiety around 4646-1153. In the past he was on lorazepam in the day  for anxiety. He has not needed it in the day lately, but now takes it around 9 PM. Currently, his only anxiety is at night, and it is related to trouble sleeping.     Refugio does not do shift work.  He works day shifts.  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 He lives alone.    Refugio does snore but does not know how frequently. He sleeps alone normally but when he goes on family vacations, they tell him he snores loudly. They hear it from outside of the room. He is not observed closely enough to know if he has gasping, snorting or witnessed apneas. His father has JUSTINA but is overweight.  Patient sleeps on his back. He has minimal morning dry mouth, denies snort arousals, morning headaches and morning confusion. He gets tingling in his legs, day and night. It is more noticeable when he lays down at night. He feels he has to keep making circles with his feet to relieve the sensation. He tried ropinirole and another medication he does not remember. Neither helped so he stopped taking them. Refugio denies any bruxism, sleep walking, sleep talking, dream enactment, sleep paralysis, cataplexy and hypnogogic/hypnopompic hallucinations. In late teens, he and his twin brother used to sleep walk and sleep drive. He has not since age 22.     Refugio has heartburn and anxiety, denies difficulty breathing through his nose, claustrophobia and reflux at night.      Refugio has gained 5-10 pounds in 5 years.  Patient describes themself as a night person.  He would prefer to go to sleep at 2:00-3:00 AM and wake up at 11:00 AM.  Patient's Rutledge Sleepiness score 0/24 inconsistent with daytime sleepiness.  He notices having more trouble with memory. He is told by his family that he looks bad.    Refugio does not take naps. He takes no inadvertant naps.  He denies dozing while driving. Patient was counseled on the importance of driving while alert, to pull over if drowsy, or nap before getting into the vehicle if sleepy.  He uses  2-3 energy drinks/day (most days 1-2). Last caffeine intake is usually after work.    Allergies:    No Known Allergies    Medications:    Current Outpatient Prescriptions   Medication Sig Dispense Refill     LORazepam (ATIVAN) 1 MG tablet TAKE 1 TABLET BY MOUTH EVERY 6 HOURS AS NEEDED FOR ANXIETY 60 tablet 1     zolpidem (AMBIEN CR) 12.5 MG CR tablet Take 1 tablet (12.5 mg) by mouth nightly as needed for sleep 30 tablet 5     citalopram (CELEXA) 20 MG tablet Take 1 tablet (20 mg) by mouth daily Take 1/2 tablet (10 mg) for 1-2 weeks, then increase to 1 tablet orally daily 90 tablet 3     diphenoxylate-atropine (LOMOTIL) 2.5-0.025 MG per tablet Take 1-2 tablets by mouth 4 times daily as needed 40 tablet 5     LORazepam (ATIVAN) 1 MG tablet Take 1 tablet (1 mg) by mouth nightly as needed for anxiety 30 tablet 2       Problem List:  Patient Active Problem List    Diagnosis Date Noted     Irritable bowel syndrome with diarrhea 03/30/2016     Priority: Medium     Restless leg 03/16/2012     Priority: Medium     Insomnia 03/08/2011     Priority: Medium     CARDIOVASCULAR SCREENING; LDL GOAL LESS THAN 160 02/10/2010     Priority: Medium     Anxiety state 01/10/2008     Priority: Medium     Problem list name updated by automated process. Provider to review          Past Medical/Surgical History:  Past Medical History:   Diagnosis Date     Esophageal reflux 1/7/2008     No past surgical history on file.    Social History:  Social History     Social History     Marital status: Single     Spouse name: N/A     Number of children: N/A     Years of education: N/A     Occupational History     Not on file.     Social History Main Topics     Smoking status: Never Smoker     Smokeless tobacco: Never Used     Alcohol use 0.0 oz/week     0 Standard drinks or equivalent per week      Comment: on the weekends     Drug use: No     Sexual activity: No     Other Topics Concern     Not on file     Social History Narrative       Family  "History:  Family History   Problem Relation Age of Onset     Hypertension Father        Review of Systems:  A complete review of systems reviewed by me is negative with the exeption of what has been mentioned in the history of present illness.  CONSTITUTIONAL: NEGATIVE for weight gain/loss, fever, chills, sweats or night sweats, drug allergies.  EYES: NEGATIVE for changes in vision, blind spots, double vision.  ENT: NEGATIVE for ear pain, sore throat, sinus pain, post-nasal drip, runny nose, bloody nose  CARDIAC: NEGATIVE for chest pain or pressure, breathlessness when lying flat, swollen legs or swollen feet.  CARDIAC: POSITIVE for rapid or fluttering heart beat  NEUROLOGIC: NEGATIVE headaches, weakness in the arms or legs.  NEUROLOGIC:  POSITIVE for numbness in the arms or legs  DERMATOLOGIC: NEGATIVE for rashes, new moles or change in mole(s)  PULMONARY: NEGATIVE SOB at rest, SOB with activity, productive cough, coughing up blood, wheezing or whistling when breathing.    PULMONARY:  POSITIVE for  dry cough  GASTROINTESTINAL: NEGATIVE for nausea or vomitting, fat or grease in stools, constipation, abdominal pain, bowel movements black in color or blood noted.  GASTROINTESTINAL:  POSITIVE for  loose or watery stools  GENITOURINARY: NEGATIVE for pain during urination, blood in urine, urinating more frequently than usual, irregular menstrual periods.  MUSCULOSKELETAL: NEGATIVE for muscle pain, bone or joint pain, swollen joints.  ENDOCRINE: NEGATIVE for diabetes.  ENDOCRINE:  POSITIVE for  increased thirst and increased urination  LYMPHATIC: NEGATIVE for swollen lymph nodes, lumps or bumps in the breasts or nipple discharge.  MENTAL HEALTH: POSITIVE for anxiety    Physical Examination:  Vitals: /76  Pulse 60  Ht 1.905 m (6' 3\")  Wt 103.1 kg (227 lb 3.2 oz)  SpO2 99%  BMI 28.4 kg/m2  Neck Circumference: 43 cm.        Calvert Total Score 7/12/2017   Total score - Calvert 0       GENERAL APPEARANCE: healthy, " alert, no distress and cooperative  EYES: Eyes grossly normal to inspection, PERRL, conjunctivae and sclerae normal and lids and lashes normal  HENT: nose and mouth without ulcers or lesions, oropharynx crowded, soft palate dependent and tongue base mildly enlarged  NECK: no adenopathy, no asymmetry, masses, or scars, thyroid normal to palpation and trachea midline and normal to palpation  RESP: lungs clear to auscultation - no rales, rhonchi or wheezes  CV: regular rates and rhythm, normal S1 S2, no S3 or S4, no murmur, click or rub and no irregular beats  LYMPHATICS: normal ant/post cervical and supraclavicular nodes  MS: extremities normal- no gross deformities noted  NEURO: Normal strength and tone, mentation intact, speech normal and cranial nerves 2-12 intact  Mallampati Class: III.  Tonsillar Stage: 1  hidden by pillars.    Impression/Plan:    (G47.21) Delayed sleep phase syndrome  (primary encounter diagnosis), (F51.04) Psychophysiologic insomnia, (F41.1) Anxiety state  Comment: Mr. Wood presents with difficulties initiating sleep. This is likely multifactorial. He definitely has a delayed sleep preference. With no obligations, he would sleep about 2 or 3 AM to 10 AM to noon. He has to be up by 6 AM when working. 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 He often falls asleep between 2-4 AM despite taking lorazepam, melatonin, ZzzQuil, and 12.5 mg zolpidem CR. He feels unrefreshed by his sleep and has been using 2-3 energy drinks per day to help him feel alert. He denies any dozing in the daytime though. In the last 5-6 years, he has started to develop a lot of anxiety around being able to fall asleep. Lately he has increased anxiety over concerns of reduced memory. He is on citalopram and lorazepam for anxiety. He denies anxiety in the daytime now, only at night. He often gets into bed around 10-11 PM. He works or watches shows on his computer until he falls asleep. He had a sleep study in  [place] in about 2012. He apparently did not have JUSTINA.   Plan: Do not get into bed before 1 AM and get up daily at 9 AM to an alarm. Get 30 minutes of bright light exposure in the morning, either with the sun or a SAD Lamp of 10,000 lux intensity. Avoid bright light, including electronics, in the hour before bedtime. Take 1 mg melatonin 3-5 hours prior to natural sleep onset. Keep a consistent sleep schedule, avoiding naps and sleeping in.  Once sleeping regularly on this schedule, start advancing the wake time and bedtime by 10 minutes 1-2 times per week. We may consider further evaluation of JUSTINA depending on results of his prior sleep study. He was advised to wean off of lorazepam very gradually.    (G25.81) Restless leg  Comment: Nightly symptoms. He has tried ropinirole and one other medication in the past and not found them helpful.   Plan: gabapentin (NEURONTIN) 100 MG capsule        He was advised to take 1 capsule around 10 PM. Increase by 1 capsule every 5 nights as needed, max 1200 gm. He was advised to contact me when he gets to 300 mg and I will prescribe a 300 mg capsule.       Literature provided regarding insomnia and circadian rhythm disorders.      He will follow up with me in approximately 1 month.       Polysomnography reviewed.  Obstructive sleep apnea reviewed.  Complications of untreated sleep apnea were reviewed.  45 minutes was spent during this visit, over 50% in counseling and coordination of care.     Bennett Goltz, PA-C    CC: Alessandra Leonardo, CNP

## 2017-07-14 DIAGNOSIS — G25.81 RESTLESS LEG: ICD-10-CM

## 2017-07-14 RX ORDER — GABAPENTIN 100 MG/1
CAPSULE ORAL
Qty: 270 CAPSULE | Refills: 1 | Status: CANCELLED | OUTPATIENT
Start: 2017-07-14

## 2017-07-14 NOTE — TELEPHONE ENCOUNTER
Patient is requesting 90 day refills in place of 30 day.     Irma NewtonBaldwin  Sleep Clinic - Specialist

## 2017-07-17 ENCOUNTER — MYC MEDICAL ADVICE (OUTPATIENT)
Dept: SLEEP MEDICINE | Facility: CLINIC | Age: 42
End: 2017-07-17

## 2017-07-17 DIAGNOSIS — G25.81 RESTLESS LEG: Primary | ICD-10-CM

## 2017-07-19 RX ORDER — GABAPENTIN 300 MG/1
CAPSULE ORAL
Qty: 90 CAPSULE | Refills: 1 | Status: SHIPPED | OUTPATIENT
Start: 2017-07-19 | End: 2017-11-29

## 2017-08-10 ENCOUNTER — MYC MEDICAL ADVICE (OUTPATIENT)
Dept: SLEEP MEDICINE | Facility: CLINIC | Age: 42
End: 2017-08-10

## 2017-08-10 DIAGNOSIS — G25.81 RESTLESS LEG: Primary | ICD-10-CM

## 2017-08-10 RX ORDER — GABAPENTIN 600 MG/1
TABLET ORAL
Qty: 90 TABLET | Refills: 3 | Status: SHIPPED | OUTPATIENT
Start: 2017-08-10 | End: 2018-06-04

## 2017-09-05 DIAGNOSIS — F41.1 ANXIETY STATE: ICD-10-CM

## 2017-09-06 RX ORDER — LORAZEPAM 1 MG/1
TABLET ORAL
Qty: 60 TABLET | Refills: 0 | Status: SHIPPED | OUTPATIENT
Start: 2017-09-06 | End: 2017-10-06

## 2017-09-06 NOTE — TELEPHONE ENCOUNTER
LORazepam (ATIVAN) 1 MG tablet      Last Written Prescription Date:  7/12/2017  Last Fill Quantity: 60,   # refills: 1  [facility] with [facility] or [facility] prescribing provider: 5/5/2017  Future Office visit:       Routing refill request to provider for review/approval because:  Drug not on the [facility] or [facility] refill protocol or controlled substance

## 2017-09-06 NOTE — TELEPHONE ENCOUNTER
Rx refilled. In my outbasket.    Please call: He needs to set up an appointment with me prior to his next refill.

## 2017-10-06 DIAGNOSIS — F41.1 ANXIETY STATE: ICD-10-CM

## 2017-10-06 RX ORDER — LORAZEPAM 1 MG/1
TABLET ORAL
Qty: 60 TABLET | Refills: 0 | Status: SHIPPED | OUTPATIENT
Start: 2017-10-06 | End: 2017-11-29

## 2017-11-03 DIAGNOSIS — F41.1 ANXIETY STATE: ICD-10-CM

## 2017-11-06 RX ORDER — LORAZEPAM 1 MG/1
TABLET ORAL
Qty: 60 TABLET | Refills: 0 | OUTPATIENT
Start: 2017-11-06

## 2017-11-06 NOTE — TELEPHONE ENCOUNTER
Lorazepam 1mg      Last Written Prescription Date:  10/6/17  Last Fill Quantity: 60,   # refills: 0  Last office visit: 5/5/17  Future Office visit:       Routing refill request to provider for review/approval because:  Drug not on the [facility], [facility] or [facility] refill protocol or controlled substance    Brittney Al RN

## 2017-11-06 NOTE — TELEPHONE ENCOUNTER
Per Dr. Hinojosa's prior refill note, patient is due for an office visit prior to any refills. Please call him to schedule.    May Schwartz MD  Internal Medicine-Pediatrics

## 2017-11-07 NOTE — TELEPHONE ENCOUNTER
Attempted to LM, Patients; phone disconnected, unable to make contact, with Patient, at this time.

## 2017-11-14 NOTE — TELEPHONE ENCOUNTER
Attempted to LMx2, Patients' phone disconnected, unable to make contact, with Patient, at this time.

## 2017-11-21 NOTE — TELEPHONE ENCOUNTER
Attempted to LMx3, Patients' voicemail box full, unable to make contact, with Patient, at this time.

## 2017-11-29 ENCOUNTER — OFFICE VISIT (OUTPATIENT)
Dept: PEDIATRICS | Facility: CLINIC | Age: 42
End: 2017-11-29
Payer: COMMERCIAL

## 2017-11-29 VITALS
WEIGHT: 226.3 LBS | OXYGEN SATURATION: 97 % | DIASTOLIC BLOOD PRESSURE: 50 MMHG | SYSTOLIC BLOOD PRESSURE: 102 MMHG | HEART RATE: 60 BPM | BODY MASS INDEX: 28.14 KG/M2 | TEMPERATURE: 98.7 F | HEIGHT: 75 IN

## 2017-11-29 DIAGNOSIS — Z23 NEED FOR PROPHYLACTIC VACCINATION AND INOCULATION AGAINST INFLUENZA: ICD-10-CM

## 2017-11-29 DIAGNOSIS — F51.01 PRIMARY INSOMNIA: ICD-10-CM

## 2017-11-29 DIAGNOSIS — F41.1 ANXIETY STATE: Primary | ICD-10-CM

## 2017-11-29 DIAGNOSIS — K58.0 IRRITABLE BOWEL SYNDROME WITH DIARRHEA: ICD-10-CM

## 2017-11-29 PROCEDURE — 99213 OFFICE O/P EST LOW 20 MIN: CPT | Mod: 25 | Performed by: INTERNAL MEDICINE

## 2017-11-29 PROCEDURE — 90471 IMMUNIZATION ADMIN: CPT | Performed by: INTERNAL MEDICINE

## 2017-11-29 PROCEDURE — 90686 IIV4 VACC NO PRSV 0.5 ML IM: CPT | Performed by: INTERNAL MEDICINE

## 2017-11-29 RX ORDER — CHOLESTYRAMINE 4 G/9G
4 POWDER, FOR SUSPENSION ORAL
Qty: 540 G | COMMUNITY
Start: 2017-11-29 | End: 2017-12-23

## 2017-11-29 RX ORDER — LORAZEPAM 1 MG/1
1 TABLET ORAL EVERY 6 HOURS PRN
Qty: 60 TABLET | Refills: 2 | Status: SHIPPED | OUTPATIENT
Start: 2017-11-29 | End: 2018-04-01

## 2017-11-29 RX ORDER — ZOLPIDEM TARTRATE 12.5 MG/1
12.5 TABLET, FILM COATED, EXTENDED RELEASE ORAL
Qty: 30 TABLET | Refills: 5 | Status: SHIPPED | OUTPATIENT
Start: 2017-11-29 | End: 2018-05-10

## 2017-11-29 RX ORDER — CITALOPRAM HYDROBROMIDE 20 MG/1
10 TABLET ORAL DAILY
Qty: 90 TABLET | Refills: 3
Start: 2017-11-29 | End: 2018-08-15

## 2017-11-29 NOTE — MR AVS SNAPSHOT
"              After Visit Summary   11/29/2017    Refugio Wood    MRN: [id number]           Patient Information     Date Of Birth          1975        Visit Information        Provider Department      11/29/2017 1:40 PM Tay Hinojosa MD [facility]        Today's Diagnoses     Anxiety state    -  1    Primary insomnia        Irritable bowel syndrome with diarrhea        Need for prophylactic vaccination and inoculation against influenza           Follow-ups after your visit        Who to contact     If you have questions or need follow up information about today's clinic visit or your schedule please contact [facility] directly at [phone].  Normal or non-critical lab and imaging results will be communicated to you by [employer], letter or phone within 4 business days after the clinic has received the results. If you do not hear from us within 7 days, please contact the clinic through [employer] or phone. If you have a critical or abnormal lab result, we will notify you by phone as soon as possible.  Submit refill requests through [employer] or call your pharmacy and they will forward the refill request to us. Please allow 3 business days for your refill to be completed.          Additional Information About Your Visit        MyChart Information     [employer] gives you secure access to your electronic health record. If you see a primary care provider, you can also send messages to your care team and make appointments. If you have questions, please call your primary care clinic.  If you do not have a primary care provider, please call [phone] and they will assist you.        Care EveryWhere ID     This is your Care EveryWhere ID. This could be used by other organizations to access your [place] medical records  [id number]        Your Vitals Were     Pulse Temperature Height Pulse Oximetry BMI (Body Mass Index)       60 98.7  F (37.1  C) (Oral) 6' 3\" (1.905 m) 97% 28.29 kg/m2        " Blood Pressure from Last 3 Encounters:   11/29/17 102/50   07/12/17 121/76   05/05/17 124/80    Weight from Last 3 Encounters:   11/29/17 226 lb 4.8 oz (102.6 kg)   07/12/17 227 lb 3.2 oz (103.1 kg)   05/05/17 223 lb (101.2 kg)              We Performed the Following     FLU VAC, SPLIT VIRUS IM > 3 YO (QUADRIVALENT) [80897]     Vaccine Administration, Initial [41643]          Today's Medication Changes          These changes are accurate as of: 11/29/17  3:24 PM.  If you have any questions, ask your nurse or doctor.               These medicines have changed or have updated prescriptions.        Dose/Directions    citalopram 20 MG tablet   Commonly known as:  celeXA   This may have changed:    - how much to take  - additional instructions   Used for:  Anxiety state   Changed by:  Tay Hinojosa MD        Dose:  10 mg   Take 0.5 tablets (10 mg) by mouth daily   Quantity:  90 tablet   Refills:  3       LORazepam 1 MG tablet   Commonly known as:  ATIVAN   This may have changed:  See the new instructions.   Used for:  Anxiety state   Changed by:  Tay Hinojosa MD        Dose:  1 mg   Take 1 tablet (1 mg) by mouth every 6 hours as needed for anxiety   Quantity:  60 tablet   Refills:  2            Where to get your medicines      Some of these will need a paper prescription and others can be bought over the counter.  Ask your nurse if you have questions.     Bring a paper prescription for each of these medications     LORazepam 1 MG tablet    zolpidem 12.5 MG CR tablet       You don't need a prescription for these medications     citalopram 20 MG tablet                Primary Care Provider Office Phone # Fax #    Tay Hinojosa [phone] [address] [place] [address]        Equal Access to Services     [facility] DILLON AH: Karina Stroud, sam schneider, cristel collazo. So [facility] [phone].    ATENCIÓN: Si paul  español, tiene a ng disposición servicios gratuitos de asistencia lingüística. Cruz crowley [phone].    We comply with applicable federal civil rights laws and [place] laws. We do not discriminate on the basis of race, color, national origin, age, disability, sex, sexual orientation, or gender identity.            Thank you!     Thank you for choosing [facility]  for your care. Our goal is always to provide you with excellent care. Hearing back from our patients is one way we can continue to improve our services. Please take a few minutes to complete the written survey that you may receive in the mail after your visit with us. Thank you!             Your Updated Medication List - Protect others around you: Learn how to safely use, store and throw away your medicines at www.disposemymeds.org.          This list is accurate as of: 11/29/17  3:24 PM.  Always use your most recent med list.                   Brand Name Dispense Instructions for use Diagnosis    cholestyramine 4 GM/DOSE powder    QUESTRAN    540 g    Take 4 g by mouth 3 times daily (with meals)    Irritable bowel syndrome with diarrhea       citalopram 20 MG tablet    celeXA    90 tablet    Take 0.5 tablets (10 mg) by mouth daily    Anxiety state       diphenoxylate-atropine 2.5-0.025 MG per tablet    LOMOTIL    40 tablet    Take 1-2 tablets by mouth 4 times daily as needed    Irritable bowel syndrome with diarrhea       gabapentin 600 MG tablet    NEURONTIN    90 tablet    Take 1 tablet by mouth at bedtime        LORazepam 1 MG tablet    ATIVAN    60 tablet    Take 1 tablet (1 mg) by mouth every 6 hours as needed for anxiety    Anxiety state       zolpidem 12.5 MG CR tablet    AMBIEN CR    30 tablet    Take 1 tablet (12.5 mg) by mouth nightly as needed for sleep    Primary insomnia

## 2017-11-29 NOTE — PROGRESS NOTES
"  SUBJECTIVE:   Refugio Wood is a 42 year old male who presents to clinic today for the following health issues:      Followup of anxiety - treating with Ativan and citalopram    Taking Medication as prescribed: taking 1/2 tablet of citalopram    Side Effects:  None    Medication Helping Symptoms:  Yes, taking as needed typically in the evening     Insomnia.   Up every am at 6 am.   Tends to like to stay up later at nighttime.  Was evaluated at a sleep clinic. Rx for gabapentin for RLS sx, is helping.    Reviewed overall medications, timing of dosing. Discussed not taking lorazepam and zolpidem near the same time.     Problem list and histories reviewed & adjusted, as indicated.        Reviewed and updated as needed this visit by clinical staff  Tobacco  Allergies  Meds  Med Hx  Surg Hx  Fam Hx  Soc Hx      Reviewed and updated as needed this visit by Provider  Tobacco  Allergies  Meds  Problems  Med Hx  Surg Hx  Fam Hx  Soc Hx          ROS:  Constitutional, HEENT, cardiovascular, pulmonary, gi and gu systems are negative, except as otherwise noted.      OBJECTIVE:   /50  Pulse 60  Temp 98.7  F (37.1  C) (Oral)  Ht 6' 3\" (1.905 m)  Wt 226 lb 4.8 oz (102.6 kg)  SpO2 97%  BMI 28.29 kg/m2  Body mass index is 28.29 kg/(m^2).  GEN: no distress  SKIN: no rashes  LUNGS: Clear to auscultation bilaterally. No rhonchi, rales, wheezes or retractions.  CV: Regular rate and rhythm.  No murmurs, rubs or gallops. Pulses 2+ radial.   PSYCH: Normal affect. Well groomed. Good eye contact.     ASSESSMENT/PLAN:       ICD-10-CM    1. Anxiety state F41.1 LORazepam (ATIVAN) 1 MG tablet     citalopram (CELEXA) 20 MG tablet   2. Primary insomnia F51.01 zolpidem (AMBIEN CR) 12.5 MG CR tablet   3. Irritable bowel syndrome with diarrhea K58.0 cholestyramine (QUESTRAN) 4 GM/DOSE powder   4. Need for prophylactic vaccination and inoculation against influenza Z23 FLU VAC, SPLIT VIRUS IM > 3 YO (QUADRIVALENT) [74588]     " Vaccine Administration, Initial [73329]     Plan to continue w/ current medications.   Avoid taking lorazepam in the evening.  OK to refill cholestyramine if needed.       Tay Hinojosa MD  [facility]

## 2017-11-29 NOTE — NURSING NOTE
"Chief Complaint   Patient presents with     Recheck Medication       Initial /50  Pulse 60  Temp 98.7  F (37.1  C) (Oral)  Ht 6' 3\" (1.905 m)  Wt 226 lb 4.8 oz (102.6 kg)  SpO2 97%  BMI 28.29 kg/m2 Estimated body mass index is 28.29 kg/(m^2) as calculated from the following:    Height as of this encounter: 6' 3\" (1.905 m).    Weight as of this encounter: 226 lb 4.8 oz (102.6 kg).  Medication Reconciliation: complete   Paola Kimbrough MA      "

## 2017-12-03 DIAGNOSIS — F51.01 PRIMARY INSOMNIA: ICD-10-CM

## 2017-12-04 RX ORDER — ZOLPIDEM TARTRATE 12.5 MG/1
TABLET, FILM COATED, EXTENDED RELEASE ORAL
Qty: 30 TABLET | Refills: 0 | OUTPATIENT
Start: 2017-12-04

## 2017-12-04 NOTE — TELEPHONE ENCOUNTER
Duplicate.     zolpidem (AMBIEN CR) 12.5 MG CR tablet was filled on 11/29/2017, qty 30 with 5 refills.

## 2017-12-23 DIAGNOSIS — K58.0 IRRITABLE BOWEL SYNDROME WITH DIARRHEA: ICD-10-CM

## 2017-12-26 RX ORDER — DIPHENOXYLATE HCL/ATROPINE 2.5-.025MG
TABLET ORAL
Qty: 90 TABLET | Refills: 0 | Status: SHIPPED | OUTPATIENT
Start: 2017-12-26 | End: 2018-03-01

## 2017-12-26 RX ORDER — CHOLESTYRAMINE 4 G/9G
POWDER, FOR SUSPENSION ORAL
Qty: 1134 G | Refills: 0 | Status: SHIPPED | OUTPATIENT
Start: 2017-12-26 | End: 2018-07-10

## 2017-12-26 NOTE — TELEPHONE ENCOUNTER
diphenoxylate-atropine (LOMOTIL) 2.5-0.025 MG per tablet      Last Written Prescription Date:  5/5/17  Last Fill Quantity: 40,   # refills: 5  Last Office Visit: 11/29/17  Future Office visit:       Routing refill request to provider for review/approval because:  Drug not on the [facility] or [facility] refill protocol or controlled substance        Requested Prescriptions   Pending Prescriptions Disp Refills     cholestyramine (QUESTRAN) 4 GM/DOSE powder [Pharmacy Med Name: CHOLESTYRAMINE POWDER (CAN) 378GM]  Last Written Prescription Date:  historical  Last Fill Quantity: 540g,  # refills: na   Last Office Visit with [facility] or [facility] prescribing provider:  11/29/17  Future Office Visit:      1134 g 0     Sig: TAKE 1 SCOOP DISSOLVED IN 2 TO 6 OUNCES OF WATER OR NONCARBONATED BEVERAGE TWICE DAILY    Bile Acid Sequestrant Agents Failed    12/24/2017  7:33 AM       Failed - Lipid panel on file in past 12 mos    No lab results found.           Passed - Recent or future visit with authorizing provider's specialty    Patient had office visit in the last year or has a visit in the next 30 days with authorizing provider.  See chart review.              Passed - Patient is age 18 years or older

## 2018-01-22 DIAGNOSIS — F41.1 ANXIETY STATE: ICD-10-CM

## 2018-01-22 RX ORDER — LORAZEPAM 1 MG/1
TABLET ORAL
Qty: 60 TABLET | Refills: 0 | OUTPATIENT
Start: 2018-01-22

## 2018-01-22 NOTE — TELEPHONE ENCOUNTER
As per , last refill on Ativan 1 mg was on 11/29/17 for 60#, haven't used the 2 extra refills.     Called pharmacy([facility] in [place]) to check on the refill. I was told by pharmacy that after one refill on 11/29, the rx was transferred to another pharmacy(they are unable to find which pharmacy it went to). Advised pt to contact the pharmacy where the rx transferred to, if he is not able to refill through them, advised to notify us. Pharmacy agrees to the plan.    Ativan 1 mg    Last Written Prescription Date:  11/29/17  Last Fill Quantity: 60,   # refills: 2  Last Office Visit: 11/29/17  Future Office visit:       Routing refill request to provider for review/approval because:  Drug not on the [facility], [facility] or [facility] refill protocol or controlled substance    Domitila, RN  Triage Nurse

## 2018-01-30 DIAGNOSIS — F41.1 ANXIETY STATE: ICD-10-CM

## 2018-01-30 RX ORDER — LORAZEPAM 1 MG/1
TABLET ORAL
Qty: 60 TABLET | Refills: 0 | COMMUNITY
Start: 2018-01-30

## 2018-01-30 NOTE — TELEPHONE ENCOUNTER
**[place]  reviewed- last filled: 11/29/17    Patient hasn't used the remaining 2 refills on the file. Checked with pharmacy, they have the refills, they will start the refill.

## 2018-01-30 NOTE — TELEPHONE ENCOUNTER
LORazepam (ATIVAN) 1 MG tablet      Last Written Prescription Date:  11/29/2017  Last Fill Quantity: 60,   # refills: 2  Last Office Visit: 11/29/2017  Future Office visit:       Routing refill request to provider for review/approval because:  Drug not on the [facility], [facility] or [facility] refill protocol or controlled substance

## 2018-03-19 DIAGNOSIS — F41.1 ANXIETY STATE: ICD-10-CM

## 2018-03-20 RX ORDER — CITALOPRAM HYDROBROMIDE 20 MG/1
TABLET ORAL
Qty: 30 TABLET | Refills: 0 | OUTPATIENT
Start: 2018-03-20

## 2018-03-20 NOTE — TELEPHONE ENCOUNTER
Not due for a refill.     citalopram (CELEXA) 20 MG tablet was filled on 11/29/2017, qty 90 with 3 refills.

## 2018-04-01 DIAGNOSIS — F41.1 ANXIETY STATE: ICD-10-CM

## 2018-04-01 NOTE — TELEPHONE ENCOUNTER
Requested Prescriptions   Pending Prescriptions Disp Refills     LORazepam (ATIVAN) 1 MG tablet [Pharmacy Med Name: LORAZEPAM 1MG TABLETS]  Last Written Prescription Date:  11/29/2017  Last Fill Quantity: 60 tablet,  # refills: 2   Last office visit: 11/29/2017 with prescribing provider:  Tay Hinojosa MD    Future Office Visit:     60 tablet 0     Sig: TAKE 1 TABLET BY MOUTH EVERY 6 HOURS AS NEEDED FOR ANXIETY    There is no refill protocol information for this order       Routing refill request to provider for review/approval because:  Drug not on the G, P or [facility] refill protocol or controlled substance

## 2018-04-02 RX ORDER — LORAZEPAM 1 MG/1
TABLET ORAL
Qty: 60 TABLET | Refills: 0 | Status: SHIPPED | OUTPATIENT
Start: 2018-04-02 | End: 2018-05-10

## 2018-04-02 NOTE — TELEPHONE ENCOUNTER
Patient only on 10 mg celexa and using 180 tablets of lorazepam in 120 days. Refill x one given. Patient needs OV with PMD to discuss poorly controlled anxiety.   Reynaldo Lauren PA-C

## 2018-05-10 DIAGNOSIS — F41.1 ANXIETY STATE: ICD-10-CM

## 2018-05-10 DIAGNOSIS — F51.01 PRIMARY INSOMNIA: ICD-10-CM

## 2018-05-10 NOTE — TELEPHONE ENCOUNTER
RX [place] ([place]) reviewed:  reviewed- recommend provider review    [place] profile:  [email or web address]    Last Filled    Zolpidem  4/16/2018, #30 (few days early)   3/19/2018, #30    Lorazepam  4/2/2018, #60    Michelle ZAMORA RN, BSN, PHN  [facility]

## 2018-05-10 NOTE — TELEPHONE ENCOUNTER
Requested Prescriptions   Pending Prescriptions Disp Refills     LORazepam (ATIVAN) 1 MG tablet [Pharmacy Med Name: LORAZEPAM 1MG TABLETS]        Last Written Prescription Date:  4/2/2018  Last Fill Quantity: 60,   # refills: 0  Last Office Visit: 11/29/2017  Future Office visit:       Routing refill request to provider for review/approval because:  Drug not on the G, P or  Health refill protocol or controlled substance   60 tablet 0     Sig: TAKE 1 TABLET BY MOUTH EVERY 6 HOURS AS NEEDED FOR ANXIETY    There is no refill protocol information for this order        zolpidem (AMBIEN CR) 12.5 MG CR tablet [Pharmacy Med Name: ZOLPIDEM ER 12.5MG TABLETS]        Last Written Prescription Date:  11/29/2017  Last Fill Quantity: 30,   # refills: 5  Last Office Visit: 11/29/2017  Future Office visit:       Routing refill request to provider for review/approval because:  Drug not on the G, P or  Health refill protocol or controlled substance   30 tablet 0     Sig: TAKE 1 TABLET BY MOUTH NIGHTLY AS NEEDED FOR SLEEP    There is no refill protocol information for this order

## 2018-05-11 RX ORDER — ZOLPIDEM TARTRATE 12.5 MG/1
TABLET, FILM COATED, EXTENDED RELEASE ORAL
Qty: 30 TABLET | Refills: 2 | Status: SHIPPED | OUTPATIENT
Start: 2018-05-11 | End: 2018-07-10

## 2018-05-11 RX ORDER — LORAZEPAM 1 MG/1
TABLET ORAL
Qty: 60 TABLET | Refills: 0 | Status: SHIPPED | OUTPATIENT
Start: 2018-05-11 | End: 2018-06-18

## 2018-06-02 DIAGNOSIS — K58.0 IRRITABLE BOWEL SYNDROME WITH DIARRHEA: ICD-10-CM

## 2018-06-02 NOTE — TELEPHONE ENCOUNTER
Requested Prescriptions   Pending Prescriptions Disp Refills     diphenoxylate-atropine (LOMOTIL) 2.5-0.025 MG per tablet [Pharmacy Med Name: DIPHENOXYLATE/ATROPINE TABLETS]  Last Written Prescription Date:  03/02/2018  Last Fill Quantity: 90 tablet,  # refills: 1   Last office visit: 11/29/2017 with prescribing provider:  Tay Hinojosa MD    Future Office Visit:     90 tablet 0     Sig: TAKE 1-2 TABLETS BY MOUTH THREE TIMES DAILY AS NEEDED FOR DIARRHEA    There is no refill protocol information for this order     Routing refill request to provider for review/approval because:  Drug not on the [facility], P or [facility] refill protocol or controlled substance

## 2018-06-04 RX ORDER — DIPHENOXYLATE HCL/ATROPINE 2.5-.025MG
TABLET ORAL
Qty: 90 TABLET | Refills: 0 | Status: SHIPPED | OUTPATIENT
Start: 2018-06-04 | End: 2018-07-10

## 2018-06-07 RX ORDER — GABAPENTIN 600 MG/1
TABLET ORAL
Qty: 90 TABLET | Refills: 1 | Status: SHIPPED | OUTPATIENT
Start: 2018-06-07 | End: 2018-07-10

## 2018-06-07 NOTE — TELEPHONE ENCOUNTER
A prescription for gabapentin 600 mg was sent to his pharmacy, #90 caps for 90 days, 1 refill.  Bennett Goltz, PA-C

## 2018-06-18 DIAGNOSIS — F41.1 ANXIETY STATE: ICD-10-CM

## 2018-06-18 NOTE — TELEPHONE ENCOUNTER
Requested Prescriptions   Pending Prescriptions Disp Refills     LORazepam (ATIVAN) 1 MG tablet [Pharmacy Med Name: LORAZEPAM 1MG TABLETS]  Last Written Prescription Date:  05/11/2018  Last Fill Quantity: 60 tablet,  # refills: 0   Last office visit: 11/29/2017 with prescribing provider:      Tay Hinojosa MD         Future Office Visit:     60 tablet 0     Sig: TAKE 1 TABLET BY MOUTH EVERY 6 HOURS AS NEEDED FOR ANXIETY    There is no refill protocol information for this order        Routing refill request to provider for review/approval because:  Drug not on the G, P or [facility] refill protocol or controlled substance

## 2018-06-19 RX ORDER — LORAZEPAM 1 MG/1
TABLET ORAL
Qty: 60 TABLET | Refills: 1 | Status: SHIPPED | OUTPATIENT
Start: 2018-06-19 | End: 2018-08-30

## 2018-07-10 ENCOUNTER — OFFICE VISIT (OUTPATIENT)
Dept: PEDIATRICS | Facility: CLINIC | Age: 43
End: 2018-07-10
Payer: COMMERCIAL

## 2018-07-10 VITALS
TEMPERATURE: 97.7 F | HEART RATE: 71 BPM | DIASTOLIC BLOOD PRESSURE: 68 MMHG | WEIGHT: 233.1 LBS | BODY MASS INDEX: 28.98 KG/M2 | SYSTOLIC BLOOD PRESSURE: 114 MMHG | HEIGHT: 75 IN | OXYGEN SATURATION: 97 %

## 2018-07-10 DIAGNOSIS — Z23 NEED FOR VACCINATION: ICD-10-CM

## 2018-07-10 DIAGNOSIS — F41.9 ANXIETY: ICD-10-CM

## 2018-07-10 DIAGNOSIS — F51.01 PRIMARY INSOMNIA: Primary | ICD-10-CM

## 2018-07-10 DIAGNOSIS — G25.81 RESTLESS LEG SYNDROME: ICD-10-CM

## 2018-07-10 DIAGNOSIS — K58.0 IRRITABLE BOWEL SYNDROME WITH DIARRHEA: ICD-10-CM

## 2018-07-10 PROCEDURE — 90471 IMMUNIZATION ADMIN: CPT | Performed by: NURSE PRACTITIONER

## 2018-07-10 PROCEDURE — 99215 OFFICE O/P EST HI 40 MIN: CPT | Mod: 25 | Performed by: NURSE PRACTITIONER

## 2018-07-10 PROCEDURE — 90715 TDAP VACCINE 7 YRS/> IM: CPT | Performed by: NURSE PRACTITIONER

## 2018-07-10 RX ORDER — ZOLPIDEM TARTRATE 12.5 MG/1
12.5 TABLET, FILM COATED, EXTENDED RELEASE ORAL
Qty: 30 TABLET | Refills: 2 | Status: SHIPPED | OUTPATIENT
Start: 2018-07-10 | End: 2018-07-29 | Stop reason: ALTCHOICE

## 2018-07-10 RX ORDER — DIPHENOXYLATE HCL/ATROPINE 2.5-.025MG
TABLET ORAL
Qty: 90 TABLET | Refills: 3 | Status: SHIPPED | OUTPATIENT
Start: 2018-07-10 | End: 2019-02-06

## 2018-07-10 RX ORDER — GABAPENTIN 600 MG/1
1200 TABLET ORAL AT BEDTIME
Qty: 90 TABLET | Refills: 1 | Status: SHIPPED | OUTPATIENT
Start: 2018-07-10 | End: 2018-07-31

## 2018-07-10 ASSESSMENT — ANXIETY QUESTIONNAIRES
5. BEING SO RESTLESS THAT IT IS HARD TO SIT STILL: NEARLY EVERY DAY
1. FEELING NERVOUS, ANXIOUS, OR ON EDGE: NEARLY EVERY DAY
GAD7 TOTAL SCORE: 10
6. BECOMING EASILY ANNOYED OR IRRITABLE: NOT AT ALL
2. NOT BEING ABLE TO STOP OR CONTROL WORRYING: SEVERAL DAYS
3. WORRYING TOO MUCH ABOUT DIFFERENT THINGS: SEVERAL DAYS
IF YOU CHECKED OFF ANY PROBLEMS ON THIS QUESTIONNAIRE, HOW DIFFICULT HAVE THESE PROBLEMS MADE IT FOR YOU TO DO YOUR WORK, TAKE CARE OF THINGS AT HOME, OR GET ALONG WITH OTHER PEOPLE: VERY DIFFICULT
7. FEELING AFRAID AS IF SOMETHING AWFUL MIGHT HAPPEN: NOT AT ALL

## 2018-07-10 ASSESSMENT — PATIENT HEALTH QUESTIONNAIRE - PHQ9: 5. POOR APPETITE OR OVEREATING: MORE THAN HALF THE DAYS

## 2018-07-10 NOTE — PROGRESS NOTES
"  SUBJECTIVE:   Refugio Wodo is a 43 year old male who presents to clinic today for the following health issues:    States he has been problems with sleeping and restless leg at night.  Medication Followup of Celexa  Wondering about weaning off - has heard there is interaction with ambien    Taking Medication as prescribed: yes    Side Effects:  None    Medication Helping Symptoms:  yes     Medication Followup of Gabapentin    Taking Medication as prescribed: yes - was told he can take up to 1200g per night for restless leg    Side Effects:  None    Medication Helping Symptoms:  yes     Medication Followup of lorazepam    Taking Medication as prescribed: yes    Side Effects:  None    Medication Helping Symptoms:  yes     Medication Followup of Ambien    Taking Medication as prescribed: yes    Side Effects:  None    Medication Helping Symptoms:  yes     Medication Followup of lomotil    Taking Medication as prescribed: yes    Side Effects:  None    Medication Helping Symptoms:  yes     ROS: const/psych/neurootherwise negative     OBJECTIVE:  /68 (BP Location: Right arm, Cuff Size: Adult Regular)  Pulse 71  Temp 97.7  F (36.5  C) (Tympanic)  Ht 6' 3\" (1.905 m)  Wt 233 lb 1.6 oz (105.7 kg)  SpO2 97%  BMI 29.14 kg/m2  CONSTITUTIONAL: Alert, well-nourished, well-groomed, NAD  RESP: Lungs CTA. No wheeze, rhonchi, rales.  CV: HRRR S1 S2 No MRG. No peripheral edema  PSYCH: Bright affect. Appropriate mentation and speech.       ASSESSMENT/PLAN:  (V68.13) Primary insomnia  (primary encounter diagnosis)  Comment: Very long discussion today with patient regarding sx of anxiety, insomnia, restless legs, and medication use. It seems that his symptoms have progressively worsened and built on each other for the last 10 years. Getting up early seems to be his biggest trigger. While he used to have daytime anxiety, he states he now only has anxiety after 8pm when thinking about having to get up in the morning, and thus " triggering anxiety and insomnia. Restless legs also contribute to the insomnia, and therefor the anxiety about sleep. He is currently on 1200 gabapentin, 12.5mg Ambien CR, 1mg Lorazepam, and 20mg Citalopram daily. He is off work for the summer and still doesn't get to bed until about 6am often. 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 He is on several medications for anxiety including some controlled substances yet his symptoms are still very poorly controlled. He lacks any basic sleep hygiene skills: Drinks 3 monster drinks and 3 cups of coffee per day, watches TV in his bedroom every night, and doesn't exercise much.   Plan: gabapentin (NEURONTIN) 600 MG tablet, zolpidem         (AMBIEN CR) 12.5 MG CR tablet       -He wants to try weaning Celexa to see if this will help his RLS. Agreed to try but warned that this may exacerbate anxiety and insomnia  -Discussed sleep hygiene. Extensive instructions below including eventual complete elimination of caffeine, staying out of his bedroom other than to sleep, and no screen time before bed  -Continue gabapentin, lorazpeam, and Ambien for now  -Recommended CBT-I and referral back to sleep specialist.   -F/U 1 month.  -May need psych and therapy referral if sx do not improve.       Patient Instructions   NO WORK RELATED THINGS OR COMPUTERS INCLUDING CHECKING EMAIL AFTER 7  NO NETFLIX AFTER 10    LORAZEPAM  GABAPENTIN AT 8PM  AMBIEN AND 600MG GABAPENTIN AT MIDNIGHT  GET IN BED AT MIDNIGHT AND READ    NOTHING IN YOUR BEDREOOM OTHER THAN SEX AND SLEEP    UP AT 8 NO MATTER WHAT    1. Celexa (Citalopram): Wean down to 1/2 tab (10mg) daily for 2 weeks then stop. See if this helps with the restless legs  2. Continue gabapentin, lorazepam, and Ambien for now  3. Wean down on caffeine. Max 1 cup of coffee per day  3. I'll call you about referrals to CBT-I and sleep clinic.  4. NO netflix during the week or after 9pm.      (G25.81) Restless leg  syndrome  Comment: No improvement with Mirapex.   Plan: gabapentin (NEURONTIN) 600 MG tablet,         diphenoxylate-atropine (LOMOTIL) 2.5-0.025 MG         per tablet        Continue gabapentin. Wean celexa.     (F41.9) Anxiety  Comment: As above.     (K58.0) Irritable bowel syndrome with diarrhea  Plan: diphenoxylate-atropine (LOMOTIL) 2.5-0.025 MG         per tablet            (Z23) Need for vaccination  Plan: TDAP, IM (10 - 64 YRS) - Adacel, ADMIN 1st         VACCINE            50 minutes spent with patient, over 1/2 in counseling and coordination of care regarding the above diagnoses.   Alessandra Leonardo, KEVINP-DNP.

## 2018-07-10 NOTE — Clinical Note
Hi there,  Do you have a sleep doc you recommend? He didn't like the pa he saw last time. He's f/u with you in 1 motnh.

## 2018-07-10 NOTE — PATIENT INSTRUCTIONS
NO WORK RELATED THINGS OR COMPUTERS INCLUDING CHECKING EMAIL AFTER 7  NO NETFLIX AFTER 10    LORAZEPAM  GABAPENTIN AT 8PM  AMBIEN AND 600MG GABAPENTIN AT MIDNIGHT  GET IN BED AT MIDNIGHT AND READ    NOTHING IN YOUR BEDREOOM OTHER THAN SEX AND SLEEP    UP AT 8 NO MATTER WHAT    1. Celexa (Citalopram): Wean down to 1/2 tab (10mg) daily for 2 weeks then stop. See if this helps with the restless legs  2. Continue gabapentin, lorazepam, and Ambien for now  3. Wean down on caffeine. Max 1 cup of coffee per day  3. I'll call you about referrals to CBT-I and sleep clinic.  4. NO netflix during the week or after 9pm.

## 2018-07-10 NOTE — MR AVS SNAPSHOT
After Visit Summary   7/10/2018    Refugio Wood    MRN: [id number]           Patient Information     Date Of Birth          1975        Visit Information        Provider Department      7/10/2018 3:00 PM Alessandra Leonardo APRN [facility]        Today's Diagnoses     Need for vaccination    -  1    Primary insomnia        Irritable bowel syndrome with diarrhea        Restless leg syndrome          Care Instructions    NO WORK RELATED THINGS OR COMPUTERS INCLUDING CHECKING EMAIL AFTER 7  NO NETFLIX AFTER 10    LORAZEPAM  GABAPENTIN AT 8PM  AMBIEN AND 600MG GABAPENTIN AT MIDNIGHT  GET IN BED AT MIDNIGHT AND READ    NOTHING IN YOUR BEDREOOM OTHER THAN SEX AND SLEEP    UP AT 8 NO MATTER WHAT    1. Celexa (Citalopram): Wean down to 1/2 tab (10mg) daily for 2 weeks then stop. See if this helps with the restless legs  2. Continue gabapentin, lorazepam, and Ambien for now  3. Wean down on caffeine. Max 1 cup of coffee per day  3. I'll call you about referrals to CBT-I and sleep clinic.  4. NO netflix during the week or after 9pm.              Follow-ups after your visit        Who to contact     If you have questions or need follow up information about today's clinic visit or your schedule please contact [facility] directly at [phone].  Normal or non-critical lab and imaging results will be communicated to you by [employer] Next[employer], letter or phone within 4 business days after the clinic has received the results. If you do not hear from us within 7 days, please contact the clinic through [employer] or phone. If you have a critical or abnormal lab result, we will notify you by phone as soon as possible.  Submit refill requests through [employer] or call your pharmacy and they will forward the refill request to us. Please allow 3 business days for your refill to be completed.          Additional Information About Your Visit        [employer] Information     [employer] gives you  "secure access to your electronic health record. If you see a primary care provider, you can also send messages to your care team and make appointments. If you have questions, please call your primary care clinic.  If you do not have a primary care provider, please call [phone] and they will assist you.        Care EveryWhere ID     This is your Care EveryWhere ID. This could be used by other organizations to access your [facility] medical records  [id number]        Your Vitals Were     Pulse Temperature Height Pulse Oximetry BMI (Body Mass Index)       71 97.7  F (36.5  C) (Tympanic) 6' 3\" (1.905 m) 97% 29.14 kg/m2        Blood Pressure from Last 3 Encounters:   07/10/18 114/68   11/29/17 102/50   07/12/17 121/76    Weight from Last 3 Encounters:   07/10/18 233 lb 1.6 oz (105.7 kg)   11/29/17 226 lb 4.8 oz (102.6 kg)   07/12/17 227 lb 3.2 oz (103.1 kg)              We Performed the Following     ADMIN 1st VACCINE     TDAP, IM (10 - 64 YRS) - Adacel        Primary Care Provider Office Phone # Fax #    Tay Hinojosa [phone]       [address] [place] [address]        Equal Access to Services     [facility]: Hadii aad ku hadasho Soomaali, waaxda luqadaha, qaybta kaalmada adeegyada, waxay naein hayrebel mtz . So [facility] [place] [phone].    ATENCIÓN: Si habla español, tiene a ng disposición servicios gratuitos de asistencia lingüística. Llame al [phone].    We comply with applicable federal civil rights laws and [place] laws. We do not discriminate on the basis of race, color, national origin, age, disability, sex, sexual orientation, or gender identity.            Thank you!     Thank you for choosing [facility]  for your care. Our goal is always to provide you with excellent care. Hearing back from our patients is one way we can continue to improve our services. Please take a few minutes to complete the written survey that you may receive in the " mail after your visit with us. Thank you!             Your Updated Medication List - Protect others around you: Learn how to safely use, store and throw away your medicines at www.disposemymeds.org.          This list is accurate as of 7/10/18  3:52 PM.  Always use your most recent med list.                   Brand Name Dispense Instructions for use Diagnosis    citalopram 20 MG tablet    celeXA    90 tablet    Take 0.5 tablets (10 mg) by mouth daily    Anxiety state       diphenoxylate-atropine 2.5-0.025 MG per tablet    LOMOTIL    90 tablet    TAKE 1-2 TABLETS BY MOUTH THREE TIMES DAILY AS NEEDED FOR DIARRHEA    Irritable bowel syndrome with diarrhea       gabapentin 600 MG tablet    NEURONTIN    90 tablet    Take 1 tablet by mouth at bedtime        LORazepam 1 MG tablet    ATIVAN    60 tablet    TAKE 1 TABLET BY MOUTH EVERY 6 HOURS AS NEEDED FOR ANXIETY    Anxiety state       zolpidem 12.5 MG CR tablet    AMBIEN CR    30 tablet    TAKE 1 TABLET BY MOUTH NIGHTLY AS NEEDED FOR SLEEP    Primary insomnia

## 2018-07-11 ASSESSMENT — PATIENT HEALTH QUESTIONNAIRE - PHQ9: SUM OF ALL RESPONSES TO PHQ QUESTIONS 1-9: 12

## 2018-07-11 ASSESSMENT — ANXIETY QUESTIONNAIRES: GAD7 TOTAL SCORE: 10

## 2018-07-17 ENCOUNTER — MYC MEDICAL ADVICE (OUTPATIENT)
Dept: PEDIATRICS | Facility: CLINIC | Age: 43
End: 2018-07-17

## 2018-07-18 NOTE — TELEPHONE ENCOUNTER
Please review the [facility] message from pt & advise. Thanks.    Jacqueline CARRANZA RN  Triage Nurse

## 2018-07-19 NOTE — TELEPHONE ENCOUNTER
Left message for patient to make an appt. With . Advised him to bring forms in if he wanted to start the process of FMLA.  Staci, CMA

## 2018-07-25 ENCOUNTER — OFFICE VISIT (OUTPATIENT)
Dept: PEDIATRICS | Facility: CLINIC | Age: 43
End: 2018-07-25
Payer: COMMERCIAL

## 2018-07-25 VITALS
SYSTOLIC BLOOD PRESSURE: 122 MMHG | BODY MASS INDEX: 29.63 KG/M2 | TEMPERATURE: 98 F | DIASTOLIC BLOOD PRESSURE: 72 MMHG | HEART RATE: 70 BPM | HEIGHT: 74 IN | WEIGHT: 230.9 LBS

## 2018-07-25 DIAGNOSIS — F51.01 PRIMARY INSOMNIA: Primary | ICD-10-CM

## 2018-07-25 DIAGNOSIS — G25.81 RESTLESS LEG: ICD-10-CM

## 2018-07-25 DIAGNOSIS — F41.1 ANXIETY STATE: ICD-10-CM

## 2018-07-25 PROCEDURE — 99214 OFFICE O/P EST MOD 30 MIN: CPT | Performed by: INTERNAL MEDICINE

## 2018-07-25 RX ORDER — TRAZODONE HYDROCHLORIDE 100 MG/1
100 TABLET ORAL
Qty: 30 TABLET | Refills: 1 | Status: SHIPPED | OUTPATIENT
Start: 2018-07-25 | End: 2018-09-14

## 2018-07-25 RX ORDER — PRAMIPEXOLE DIHYDROCHLORIDE 0.25 MG/1
0.25 TABLET ORAL AT BEDTIME
Qty: 90 TABLET | Refills: 0 | Status: SHIPPED | OUTPATIENT
Start: 2018-07-25 | End: 2018-07-30 | Stop reason: SINTOL

## 2018-07-25 NOTE — MR AVS SNAPSHOT
After Visit Summary   7/25/2018    Refugio Wood    MRN: [id number]           Patient Information     Date Of Birth          1975        Visit Information        Provider Department      7/25/2018 2:40 PM Tay Hinojosa MD [facility]        Today's Diagnoses     Restless leg    -  1    Anxiety state        Insomnia          Care Instructions    Wean off gabapentin   Take 1/2 tablet for the next 3-4 nights, then stop    Start Pramipexole 0.25 mg tablets    Treating restless legs   1 tablet at nighttime for 1 week, then 2 for 1 week, then 3 then 4.   Contact me in a month with an update - the dose can be increased further if needed    OK to stop citalopram   10 mg for the next 2 weeks, then you can stop     Limit lorazepam to 1 mg per day    Stop zolpidem    Start trazodone 100 mg at bedtime   The dose can be increased if needed     Look into counseling options    Follow up in about 1 month           Follow-ups after your visit        Who to contact     If you have questions or need follow up information about today's clinic visit or your schedule please contact [facility] directly at [phone].  Normal or non-critical lab and imaging results will be communicated to you by Packetmotionhart, letter or phone within 4 business days after the clinic has received the results. If you do not hear from us within 7 days, please contact the clinic through [employer] or phone. If you have a critical or abnormal lab result, we will notify you by phone as soon as possible.  Submit refill requests through Jur[employer]at or call your pharmacy and they will forward the refill request to us. Please allow 3 business days for your refill to be completed.          Additional Information About Your Visit        Packet[employer] Information     [employer] gives you secure access to your electronic health record. If you see a primary care provider, you can also send messages to your care team and make appointments. If you  "have questions, please call your primary care clinic.  If you do not have a primary care provider, please call [phone] and they will assist you.        Care EveryWhere ID     This is your Care EveryWhere ID. This could be used by other organizations to access your [facility] medical records  [id number]        Your Vitals Were     Pulse Temperature Height BMI (Body Mass Index)          70 98  F (36.7  C) (Tympanic) 6' 2\" (1.88 m) 29.65 kg/m2         Blood Pressure from Last 3 Encounters:   07/25/18 122/72   07/10/18 114/68   11/29/17 102/50    Weight from Last 3 Encounters:   07/25/18 230 lb 14.4 oz (104.7 kg)   07/10/18 233 lb 1.6 oz (105.7 kg)   11/29/17 226 lb 4.8 oz (102.6 kg)              Today, you had the following     No orders found for display         Today's Medication Changes          These changes are accurate as of 7/25/18  3:32 PM.  If you have any questions, ask your nurse or doctor.               Start taking these medicines.        Dose/Directions    pramipexole 0.25 MG tablet   Commonly known as:  MIRAPEX   Used for:  Restless leg   Started by:  Tay Hinojosa MD        Dose:  0.25 mg   Take 1 tablet (0.25 mg) by mouth At Bedtime Increase each week to 2 then 3 then 4 tablets.   Quantity:  90 tablet   Refills:  0       traZODone 100 MG tablet   Commonly known as:  DESYREL   Used for:  Primary insomnia   Started by:  Tay Hinojosa MD        Dose:  100 mg   Take 1 tablet (100 mg) by mouth nightly as needed for sleep   Quantity:  30 tablet   Refills:  1            Where to get your medicines      These medications were sent to [facility] [address] [place] [address] AT [facility] OF  & [address] [address]     Phone:  [phone]     pramipexole 0.25 MG tablet    traZODone 100 MG tablet                Primary Care Provider Office Phone # Fax #    Tay Hinojosa [phone] [address] [place] " [id number]        Equal Access to Services     [facility]: Hadii deven eric cihkis [place], waaxda luqadaha, qaybta kafernandoda annemarierajiadam, cristel cortezedwardsylvester rashid. So [facility] [phone].    ATENCIÓN: Si habla español, tiene a gn disposición servicios gratuitos de asistencia lingüística. Angelicame al [phone].    We comply with applicable federal civil rights laws and [place] laws. We do not discriminate on the basis of race, color, national origin, age, disability, sex, sexual orientation, or gender identity.            Thank you!     Thank you for choosing [facility] [place]  for your care. Our goal is always to provide you with excellent care. Hearing back from our patients is one way we can continue to improve our services. Please take a few minutes to complete the written survey that you may receive in the mail after your visit with us. Thank you!             Your Updated Medication List - Protect others around you: Learn how to safely use, store and throw away your medicines at www.disposemymeds.org.          This list is accurate as of 7/25/18  3:32 PM.  Always use your most recent med list.                   Brand Name Dispense Instructions for use Diagnosis    * citalopram 20 MG tablet    celeXA    90 tablet    Take 0.5 tablets (10 mg) by mouth daily    Anxiety state       * citalopram 20 MG tablet    celeXA    90 tablet    TAKE 1/2 TABLET BY MOUTH DAILY FOR 1 TO 2 WEEKS THEN INCREASE TO 1 TABLET DAILY    Anxiety state       diphenoxylate-atropine 2.5-0.025 MG per tablet    LOMOTIL    90 tablet    TAKE 1-2 TABLETS BY MOUTH THREE TIMES DAILY AS NEEDED FOR DIARRHEA    Irritable bowel syndrome with diarrhea, Restless leg syndrome       gabapentin 600 MG tablet    NEURONTIN    90 tablet    Take 2 tablets (1,200 mg) by mouth At Bedtime Take 1 tablet by mouth at bedtime    Restless leg syndrome, Primary insomnia       LORazepam 1 MG tablet    ATIVAN    60 tablet    TAKE 1 TABLET BY MOUTH EVERY 6  HOURS AS NEEDED FOR ANXIETY    Anxiety state       pramipexole 0.25 MG tablet    MIRAPEX    90 tablet    Take 1 tablet (0.25 mg) by mouth At Bedtime Increase each week to 2 then 3 then 4 tablets.    Restless leg       traZODone 100 MG tablet    DESYREL    30 tablet    Take 1 tablet (100 mg) by mouth nightly as needed for sleep    Primary insomnia       zolpidem 12.5 MG CR tablet    AMBIEN CR    30 tablet    Take 1 tablet (12.5 mg) by mouth nightly as needed    Primary insomnia       * Notice:  This list has 2 medication(s) that are the same as other medications prescribed for you. Read the directions carefully, and ask your doctor or other care provider to review them with you.

## 2018-07-25 NOTE — PROGRESS NOTES
"  SUBJECTIVE:   Refugio Wood is a 43 year old male who presents to clinic today for the following health issues:    Patient here today with FMLA forms.  Also has questions about medications.    Having persistent issues with anxiety, insomnia and RLS.  Sx ongoing for the past few years.  Has been evaluated by sleep clinic.  Taking medications for sx.    Currently treating anxiety with lorazepam. Typically takes a dose in the evening after work.  Dose is 1 mg.  Feels does help, but does not help all of the time.  Had rx for citalopram, is not taking.  At a recent visit, had a recommendation to restart. He has not restarted as of yet.  Discussed that lorazepam should optimally be used sporadically, not every day.    For sleep, had been taking Ambien.   Not working well. Difficulty falling asleep.   Rx earlier this month for Ambien CR, he has not yet started this.  Discussed that med change is likely in order. May need to alternate medications.  Also discussed sleep hygiene.    RLS. Treating w/ gabapentin. Does not seem to be working well now.  Discussed alternate medications.    FMLA discussed. Last year he opted to start late on Wednesdays to allow himself to sleep in.  Discussed options. Forms completed.     Problem list and histories reviewed & adjusted, as indicated.    Labs reviewed in EPIC    Reviewed and updated as needed this visit by Provider  Tobacco  Allergies  Meds  Problems  Med Hx  Surg Hx  Fam Hx  Soc Hx          ROS:  Constitutional, HEENT, cardiovascular, pulmonary, gi and gu systems are negative, except as otherwise noted.    OBJECTIVE:     /72 (BP Location: Right arm, Cuff Size: Adult Large)  Pulse 70  Temp 98  F (36.7  C) (Tympanic)  Ht 6' 2\" (1.88 m)  Wt 230 lb 14.4 oz (104.7 kg)  BMI 29.65 kg/m2  Body mass index is 29.65 kg/(m^2).  GEN: No distress  SKIN: No rashes  LUNGS: Clear to auscultation bilaterally. No rhonchi, rales, wheezes or retractions.  CV: Regular rate and " rhythm.  No murmur. Pulses 2+ radial.   PSYCH: Normal affect. Well groomed. Good eye contact. .    ASSESSMENT/PLAN:       ICD-10-CM    1. Insomnia F51.01 traZODone (DESYREL) 100 MG tablet   2. Anxiety state F41.1    3. Restless leg G25.81 pramipexole (MIRAPEX) 0.25 MG tablet      Patient Instructions   Wean off gabapentin   Take 1/2 tablet for the next 3-4 nights, then stop    Start Pramipexole 0.25 mg tablets    Treating restless legs   1 tablet at nighttime for 1 week, then 2 for 1 week, then 3 then 4.   Contact me in a month with an update - the dose can be increased further if needed    OK to stop citalopram   10 mg for the next 2 weeks, then you can stop     Limit lorazepam to 1 mg per day    Stop zolpidem    Start trazodone 100 mg at bedtime   The dose can be increased if needed     Look into counseling options    Follow up in about 1 month       A total of 25 minutes was spent in face-to-face contact with Refugio frye in clinic, of which >50% was for counseling of insomnia, anxiety, RLS dx and management options.      Tay Hinojosa MD  [facility]

## 2018-08-14 ENCOUNTER — MYC MEDICAL ADVICE (OUTPATIENT)
Dept: PEDIATRICS | Facility: CLINIC | Age: 43
End: 2018-08-14

## 2018-08-15 ENCOUNTER — OFFICE VISIT (OUTPATIENT)
Dept: PEDIATRICS | Facility: CLINIC | Age: 43
End: 2018-08-15
Payer: COMMERCIAL

## 2018-08-15 VITALS
TEMPERATURE: 98.3 F | HEART RATE: 70 BPM | OXYGEN SATURATION: 98 % | WEIGHT: 230 LBS | HEIGHT: 74 IN | BODY MASS INDEX: 29.52 KG/M2 | RESPIRATION RATE: 16 BRPM | SYSTOLIC BLOOD PRESSURE: 126 MMHG | DIASTOLIC BLOOD PRESSURE: 80 MMHG

## 2018-08-15 DIAGNOSIS — G25.81 RESTLESS LEG: ICD-10-CM

## 2018-08-15 DIAGNOSIS — F41.1 ANXIETY STATE: ICD-10-CM

## 2018-08-15 DIAGNOSIS — F51.01 PRIMARY INSOMNIA: Primary | ICD-10-CM

## 2018-08-15 PROCEDURE — 99214 OFFICE O/P EST MOD 30 MIN: CPT | Performed by: INTERNAL MEDICINE

## 2018-08-15 NOTE — PROGRESS NOTES
"  SUBJECTIVE:   Refugio Wood is a 43 year old male who presents to clinic today for the following health issues:    Follow-up with regard to anxiety and insomnia and RLS.    Adjusted medication for RLS.   Trial of pramipexole. Did not work well, had side effects after only a few doses so stopped taking.  Restarted gabapentin as he feels this helps at least somewhat.    Ongoing concerns regarding anxiety and insomnia.  Has nights when he cannot sleep, especially during the work year.   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    Last year he managed by sleeping in on Wednesdays then working late.  FMLA forms were recently completed.   He is not allowed to work late this year, and is concerned about managing his sx.    Plans to start CBT - has initial visits scheduled.  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    FMLA forms reviewed, completed today.  Discussed that if he is taking a block of time off, my expectation is that he is enrolled in high frequency counseling or treatment programs for his anxiety and insomnia.       Problem list and histories reviewed & adjusted, as indicated.    Reviewed and updated as needed this visit by Provider  Tobacco  Allergies  Meds  Problems  Med Hx  Surg Hx  Fam Hx  Soc Hx            OBJECTIVE:     /80 (BP Location: Right arm, Patient Position: Chair, Cuff Size: Adult Large)  Pulse 70  Temp 98.3  F (36.8  C) (Tympanic)  Resp 16  Ht 6' 2\" (1.88 m)  Wt 230 lb (104.3 kg)  SpO2 98%  BMI 29.53 kg/m2  Body mass index is 29.53 kg/(m^2).  GEN: no distress  PSYCH: Normal affect. Well groomed. Good eye contact.     ASSESSMENT/PLAN:       ICD-10-CM    1. Insomnia F51.01    2. Restless leg G25.81    3. Anxiety state F41.1      No medication changes today.  FMLA forms completed and sent for abstraction.  Plan up to 10 weeks off for continuous missed time.  As above, expectation is " rigorous counseling to improve his sleep and anxiety.    A total of 25 minutes was spent in face-to-face contact with Refugio uday in clinic, of which >50% was for counseling of anxiety and insomnia management as well as completion of FMLA forms during the visit.      Tay Hinojosa MD  [facility]

## 2018-08-18 NOTE — PATIENT INSTRUCTIONS
Assuming you opt to take a block of time off work to focus on your health, you should be active in CBT visits or potentially even day treatment programs for anxiety management.

## 2018-08-30 DIAGNOSIS — F41.1 ANXIETY STATE: ICD-10-CM

## 2018-08-30 RX ORDER — LORAZEPAM 1 MG/1
1 TABLET ORAL EVERY 6 HOURS
Qty: 60 TABLET | Refills: 0 | Status: SHIPPED | OUTPATIENT
Start: 2018-08-30 | End: 2018-10-22

## 2018-08-30 NOTE — TELEPHONE ENCOUNTER
The rx has been faxed and received and the pt is aware.   Catherine Farris on 8/30/2018 at 12:56 PM

## 2018-08-30 NOTE — TELEPHONE ENCOUNTER
Requested Prescriptions   Pending Prescriptions Disp Refills     LORazepam (ATIVAN) 1 MG tablet      Last Written Prescription Date:  6/19/2018  Last Fill Quantity: 60,   # refills: 1  Last Office Visit: 8/15/2018  Future Office visit:       Routing refill request to provider for review/approval because:  Drug not on the [facility], P or [facility] refill protocol or controlled substance   60 tablet 1     Sig: Take 1 tablet (1 mg) by mouth every 6 hours    There is no refill protocol information for this order

## 2018-08-30 NOTE — TELEPHONE ENCOUNTER
[place] reviewed:  reviewed- no concerns    [place]:  [email or web address]      Last Filled:    7/18/2018, #60  6/19/2018, #60    Michelle ZAMORA RN, BSN, PHN  [place]

## 2018-08-30 NOTE — TELEPHONE ENCOUNTER
The pt is out and would like this medication refilled today if possible. He will be leaving on a flight this afternoon about 3pm. Thank you.   Catherine Farris on 8/30/2018 at 10:23 AM

## 2018-09-06 ENCOUNTER — MYC MEDICAL ADVICE (OUTPATIENT)
Dept: PEDIATRICS | Facility: CLINIC | Age: 43
End: 2018-09-06

## 2018-09-06 NOTE — TELEPHONE ENCOUNTER
Called pt back. He is in [place] on a vacation. He saw a provider in [facility] recently who requested those OV notes. He provided verbal consent to fax the OV notes from 8/15 & 7/25 to [facility] at [phone](Attn: Jennifer).     Hand faxed OV notes as per his request.    Domitila, RN  Triage Nurse

## 2018-09-06 NOTE — TELEPHONE ENCOUNTER
See pt's MC message. Need more clarifications. Called pt at [phone] & LM to call us back.    Details: Pt was seen by  on 8/15 & 7/25 for anxiety/insomnia/RLS f/u.  has filled FMLA forms during those visits(available under media). Now pt requesting us to fax OV notes from those 2 visits to his other provider. Need to know who is that provider because there is no MARSHALL signed by pt release his medical records. But if its one of the providers that we referred, then we can fax w/o MARSHALL. We have provided referral for sleep eval. Also, I see notes that pt is planning to start CBT. He works in school.     Notes from 8/15/18:  FMLA forms reviewed, completed today.  Discussed that if he is taking a block of time off, my expectation is that he is enrolled in high frequency counseling or treatment programs for his anxiety and insomnia.     No medication changes today.  FMLA forms completed and sent for abstraction.  Plan up to 10 weeks off for continuous missed time.  As above, expectation is rigorous counseling to improve his sleep and anxiety.    Domitila, RN  Triage Nurse

## 2018-09-14 DIAGNOSIS — F51.01 PRIMARY INSOMNIA: ICD-10-CM

## 2018-09-15 NOTE — TELEPHONE ENCOUNTER
"Requested Prescriptions   Pending Prescriptions Disp Refills     traZODone (DESYREL) 100 MG tablet [Pharmacy Med Name: TRAZODONE 100MG TABLETS]  Last Written Prescription Date:  7/25/18  Last Fill Quantity: 30 TABLET,  # refills: 1   Last office visit: 8/15/2018 with prescribing provider:  LONNIE   Future Office Visit:     30 tablet 0     Sig: TAKE 1 TABLET(100 MG) BY MOUTH EVERY NIGHT AS NEEDED FOR SLEEP    Serotonin Modulators Passed    9/14/2018  9:38 PM       Passed - Recent (12 mo) or future (30 days) visit within the authorizing provider's specialty    Patient had office visit in the last 12 months or has a visit in the next 30 days with authorizing provider or within the authorizing provider's specialty.  See \"Patient Info\" tab in inbasket, or \"Choose Columns\" in Meds & Orders section of the refill encounter.           Passed - Patient is age 18 or older          "

## 2018-09-18 RX ORDER — TRAZODONE HYDROCHLORIDE 100 MG/1
TABLET ORAL
Qty: 30 TABLET | Refills: 5 | Status: SHIPPED | OUTPATIENT
Start: 2018-09-18 | End: 2019-06-17

## 2018-09-18 NOTE — TELEPHONE ENCOUNTER
Routing refill request to provider for review/approval because:  Drug interaction warning    Lay Fraire RN BSN  [facility]  [phone]

## 2018-10-22 DIAGNOSIS — F41.1 ANXIETY STATE: ICD-10-CM

## 2018-10-22 NOTE — TELEPHONE ENCOUNTER
Requested Prescriptions   Pending Prescriptions Disp Refills     LORazepam (ATIVAN) 1 MG tablet        Last Written Prescription Date:  8/30/2018  Last Fill Quantity: 60,   # refills: 0  Last Office Visit: 8/15/2018  Future Office visit:       Routing refill request to provider for review/approval because:  Drug not on the [facility], P or [facility] refill protocol or controlled substance   60 tablet 0     Sig: Take 1 tablet (1 mg) by mouth every 6 hours    There is no refill protocol information for this order

## 2018-10-23 RX ORDER — LORAZEPAM 1 MG/1
1 TABLET ORAL EVERY 6 HOURS
Qty: 60 TABLET | Refills: 0 | Status: SHIPPED | OUTPATIENT
Start: 2018-10-23 | End: 2018-12-01

## 2018-12-01 ENCOUNTER — MYC MEDICAL ADVICE (OUTPATIENT)
Dept: PEDIATRICS | Facility: CLINIC | Age: 43
End: 2018-12-01

## 2018-12-01 DIAGNOSIS — F41.1 ANXIETY STATE: ICD-10-CM

## 2018-12-01 DIAGNOSIS — F51.01 PRIMARY INSOMNIA: Primary | ICD-10-CM

## 2018-12-01 NOTE — TELEPHONE ENCOUNTER
Discontinued on 7/29/18 for alternate therapy and Trazodone Started.    My chart message sent to patient.  Hillary Flores RN - Triage  [facility]

## 2018-12-01 NOTE — TELEPHONE ENCOUNTER
Requested Prescriptions   Pending Prescriptions Disp Refills     LORazepam (ATIVAN) 1 MG tablet [Pharmacy Med Name: LORAZEPAM 1MG TABLETS]  Last Written Prescription Date:  10/23/2018  Last Fill Quantity: 60 tablet,  # refills: 0   Last office visit: 8/15/2018 with prescribing provider:  Tay Hinojosa MD    Future Office Visit:     60 tablet 0     Sig: TAKE 1 TABLET BY MOUTH EVERY 6 HOURS    There is no refill protocol information for this order

## 2018-12-01 NOTE — TELEPHONE ENCOUNTER
Requested Prescriptions   Pending Prescriptions Disp Refills     zolpidem ER (AMBIEN CR) 12.5 MG CR tablet [Pharmacy Med Name: ZOLPIDEM ER 12.5MG TABLETS]  DISCONTINUED  Last Written Prescription Date:  07/10/2018  Last Fill Quantity: 30 tablet,  # refills: 2   Last office visit: 8/15/2018 with prescribing provider:  Tay Hinojosa MD    Future Office Visit:     30 tablet 0     Sig: TAKE 1 TABLET BY MOUTH NIGHTLY AS NEEDED FOR SLEEP    There is no refill protocol information for this order     Routing refill request to provider for review/approval because:  Drug not on the [facility], P or [facility] refill protocol or controlled substance

## 2018-12-02 NOTE — TELEPHONE ENCOUNTER
Routing refill request to provider for review/approval because:  Drug not active on patient's medication list: Discontinued 7/29/2018 by PCP.    Patient was contacted and reports:  Dr. Hinojosa suggested that I try alternating sleep meds each week.  I have found his suggestion to be helpful and my sleep has improved greatly.  I hope I can still refill my ambien (I did discontinue two meds a couple months ago: Gabapentin and Citalipram).      Please advise,  Hillary Flores RN - Triage  [facility]

## 2018-12-02 NOTE — TELEPHONE ENCOUNTER
Routing refill request to provider for review/approval because:  Drug not on the [facility], P or  Health refill protocol or controlled substance     reviewed 8/30/2018    Hillary Flores RN - Triage  [facility]

## 2018-12-03 RX ORDER — ZOLPIDEM TARTRATE 12.5 MG/1
TABLET, FILM COATED, EXTENDED RELEASE ORAL
Qty: 30 TABLET | Refills: 1 | Status: SHIPPED | OUTPATIENT
Start: 2018-12-03 | End: 2019-02-05

## 2018-12-03 RX ORDER — LORAZEPAM 1 MG/1
TABLET ORAL
Qty: 60 TABLET | Refills: 0 | Status: SHIPPED | OUTPATIENT
Start: 2018-12-03 | End: 2019-01-08

## 2019-01-08 DIAGNOSIS — F41.1 ANXIETY STATE: ICD-10-CM

## 2019-01-08 NOTE — TELEPHONE ENCOUNTER
Requested Prescriptions   Pending Prescriptions Disp Refills     LORazepam (ATIVAN) 1 MG tablet [Pharmacy Med Name: LORAZEPAM 1MG TABLETS]        Last Written Prescription Date:  12/3/2018  Last Fill Quantity: 60,   # refills: 0  Last Office Visit: 8/15/2018  Future Office visit:       Routing refill request to provider for review/approval because:  Drug not on the [facility], [facility] or [facility] refill protocol or controlled substance   60 tablet 0     Sig: TAKE 1 TABLET BY MOUTH EVERY 6 HOURS    There is no refill protocol information for this order

## 2019-01-09 RX ORDER — LORAZEPAM 1 MG/1
TABLET ORAL
Qty: 60 TABLET | Refills: 1 | Status: SHIPPED | OUTPATIENT
Start: 2019-01-09 | End: 2019-02-18

## 2019-02-05 DIAGNOSIS — F51.01 PRIMARY INSOMNIA: ICD-10-CM

## 2019-02-06 DIAGNOSIS — G25.81 RESTLESS LEG SYNDROME: ICD-10-CM

## 2019-02-06 DIAGNOSIS — K58.0 IRRITABLE BOWEL SYNDROME WITH DIARRHEA: ICD-10-CM

## 2019-02-06 RX ORDER — ZOLPIDEM TARTRATE 12.5 MG/1
TABLET, FILM COATED, EXTENDED RELEASE ORAL
Qty: 30 TABLET | Refills: 5 | Status: SHIPPED | OUTPATIENT
Start: 2019-02-06 | End: 2019-07-27

## 2019-02-06 RX ORDER — DIPHENOXYLATE HCL/ATROPINE 2.5-.025MG
TABLET ORAL
Qty: 90 TABLET | Refills: 3 | Status: SHIPPED | OUTPATIENT
Start: 2019-02-06 | End: 2019-10-09

## 2019-02-06 NOTE — TELEPHONE ENCOUNTER
Requested Prescriptions   Pending Prescriptions Disp Refills     zolpidem ER (AMBIEN CR) 12.5 MG CR tablet [Pharmacy Med Name: ZOLPIDEM   ER 12.5MG TABLETS]  Last Written Prescription Date:  12/03/2018  Last Fill Quantity: 30 tablet,  # refills: 1   Last office visit: 8/15/2018 with prescribing provider:  Tay Hinojosa MD    Future Office Visit:     30 tablet 0     Sig: TAKE 1 TABLET BY MOUTH AT BEDTIME AS NEEDED FOR SLEEP    There is no refill protocol information for this order     Routing refill request to provider for review/approval because:  Drug not on the [facility], P or [facility] refill protocol or controlled substance

## 2019-02-06 NOTE — TELEPHONE ENCOUNTER
diphenoxylate-atropine (LOMOTIL) 2.5-0.025 MG per tablet      Last Written Prescription Date:  7/10/2018  Last Fill Quantity: 90 tablet,  # refills: 3   Last Office Visit: 8/15/2018   Future Office Visit:         Routing refill request to provider for review/approval because:  Drug not on the [facility], [facility] or [facility] refill protocol or controlled substance

## 2019-02-17 ENCOUNTER — MYC MEDICAL ADVICE (OUTPATIENT)
Dept: PEDIATRICS | Facility: CLINIC | Age: 44
End: 2019-02-17

## 2019-02-18 ENCOUNTER — MYC REFILL (OUTPATIENT)
Dept: PEDIATRICS | Facility: CLINIC | Age: 44
End: 2019-02-18

## 2019-02-18 DIAGNOSIS — F41.1 ANXIETY STATE: ICD-10-CM

## 2019-02-18 NOTE — TELEPHONE ENCOUNTER
RX was filled on 01/09 for Qty of 60 with 1 additional refill.  On 01/10 filled RX.  Transferred RX to [facility] on 02/05/19.  Patient needs to contact that pharmacy for a refill.  Patient notified.  ANA Iglesias RN

## 2019-02-19 RX ORDER — LORAZEPAM 1 MG/1
1 TABLET ORAL EVERY 6 HOURS PRN
Qty: 60 TABLET | Refills: 1 | Status: SHIPPED | OUTPATIENT
Start: 2019-02-19 | End: 2019-05-12

## 2019-02-19 RX ORDER — LORAZEPAM 1 MG/1
TABLET ORAL
Qty: 60 TABLET | Refills: 0 | OUTPATIENT
Start: 2019-02-19

## 2019-02-19 NOTE — TELEPHONE ENCOUNTER
Lorazepam 1 mg      Last Written Prescription Date:  01/09/19  Last Fill Quantity: 60,   # refills: 1  Last Office Visit: 08/15/18  Future Office visit:       Routing refill request to provider for review/approval because:  Drug not on the G, P or [facility] refill protocol or controlled substance    Copied from telephone encounter from Yanna Caldwell  Patient called he has been out of his meds for 2 weeks, I called the new pharmacy and they told me they closed his original RX, so a new one needs sent.   ANA Iglesias RN

## 2019-02-19 NOTE — TELEPHONE ENCOUNTER
Please see second encounter regarding this refill.  Per note from shabbir Caldwell-Patient called he has been out of his meds for 2 weeks, I called the pharmacy and they told me they closed his original RX, so a new one needs sent.   Refill encounter sent to Dr. Hinojosa for review.  ANA Iglesias RN

## 2019-02-19 NOTE — TELEPHONE ENCOUNTER
Patient called he has been out of his meds for 2 weeks, I called the pharmacy and they told me they closed his original RX, so a new one needs sent. I called Traci HARRELL explained it to her she is going to have  sign on the rx and get sent over there

## 2019-02-19 NOTE — TELEPHONE ENCOUNTER
Requested Prescriptions   Pending Prescriptions Disp Refills     LORazepam (ATIVAN) 1 MG tablet [Pharmacy Med Name: LORAZEPAM 1MG TABLETS]        Last Written Prescription Date:  1/9/2019  Last Fill Quantity: 60,   # refills: 1  Last Office Visit: 8/15/2018  Future Office visit:       Routing refill request to provider for review/approval because:  Drug not on the [facility], [facility] or [facility] refill protocol or controlled substance   60 tablet 0     Sig: TAKE 1 TABLET BY MOUTH EVERY 6 HOURS    There is no refill protocol information for this order

## 2019-02-20 NOTE — TELEPHONE ENCOUNTER
Script was faxed per chart review. Patient was already informed.  Tisha Young RN  Message handled by Nurse Triage.

## 2019-05-12 DIAGNOSIS — F41.1 ANXIETY STATE: ICD-10-CM

## 2019-05-13 RX ORDER — LORAZEPAM 1 MG/1
TABLET ORAL
Qty: 60 TABLET | Refills: 1 | Status: SHIPPED | OUTPATIENT
Start: 2019-05-13 | End: 2019-07-27

## 2019-05-13 NOTE — TELEPHONE ENCOUNTER
Routing refill request to provider for review/approval because:  Drug not on the [facility] refill protocol   Lanette Vang RN

## 2019-05-13 NOTE — TELEPHONE ENCOUNTER
Requested Prescriptions   Pending Prescriptions Disp Refills     LORazepam (ATIVAN) 1 MG tablet [Pharmacy Med Name: LORAZEPAM 1MG TABLETS] 60 tablet 0     Sig: TAKE 1 TABLET BY MOUTH EVERY 6 HOURS AS NEEDED FOR ANXIETY       There is no refill protocol information for this order        Last Written Prescription Date:  2/19/19  Last Fill Quantity: 60,  # refills: 1    Last office visit: 8/15/2018 with prescribing provider:  Tay Hinojosa MD       Future Office Visit:

## 2019-06-17 ENCOUNTER — TELEPHONE (OUTPATIENT)
Dept: PEDIATRICS | Facility: CLINIC | Age: 44
End: 2019-06-17

## 2019-06-17 DIAGNOSIS — F51.01 PRIMARY INSOMNIA: ICD-10-CM

## 2019-06-17 NOTE — TELEPHONE ENCOUNTER
"Requested Prescriptions   Pending Prescriptions Disp Refills     traZODone (DESYREL) 100 MG tablet [Pharmacy Med Name: TRAZODONE 100MG TABLETS]  Last Written Prescription Date:  9/18/18  Last Fill Quantity: 30,  # refills: 5    Last office visit: 8/15/2018 with prescribing provider:  Tay Hinojosa MD       Future Office Visit:     30 tablet 0     Sig: TAKE 1 TABLET(100 MG) BY MOUTH EVERY NIGHT AS NEEDED FOR SLEEP       Serotonin Modulators Passed - 6/17/2019  3:40 PM        Passed - Recent (12 mo) or future (30 days) visit within the authorizing provider's specialty     Patient had office visit in the last 12 months or has a visit in the next 30 days with authorizing provider or within the authorizing provider's specialty.  See \"Patient Info\" tab in inbasket, or \"Choose Columns\" in Meds & Orders section of the refill encounter.              Passed - Medication is active on med list        Passed - Patient is age 18 or older          "

## 2019-06-18 RX ORDER — TRAZODONE HYDROCHLORIDE 100 MG/1
TABLET ORAL
Qty: 30 TABLET | Refills: 2 | Status: SHIPPED | OUTPATIENT
Start: 2019-06-18 | End: 2019-10-09

## 2019-06-18 NOTE — TELEPHONE ENCOUNTER
Patient reports, he does alternate between Trazodone one week then Ambien the next week.     He is on vacation, will call back to set up an appointment in August.     Prescription approved per [facility] Refill Protocol.

## 2019-06-18 NOTE — TELEPHONE ENCOUNTER
Need to clarify if patient is still alternating Trazodone 100mg one week and Zolpidem ER 12.5mg the next week. Patient due for an appointment in August. Patient should have enough Zolpidem to get to appointment but not sure how much Trazodone he will need.     Left voicemail asking patient to return call.     LAUREN Ignacio RN

## 2019-07-27 ENCOUNTER — MYC REFILL (OUTPATIENT)
Dept: PEDIATRICS | Facility: CLINIC | Age: 44
End: 2019-07-27

## 2019-07-27 DIAGNOSIS — F41.1 ANXIETY STATE: ICD-10-CM

## 2019-07-27 DIAGNOSIS — F51.01 PRIMARY INSOMNIA: ICD-10-CM

## 2019-07-27 NOTE — TELEPHONE ENCOUNTER
Requested Prescriptions   Pending Prescriptions Disp Refills     zolpidem ER (AMBIEN CR) 12.5 MG CR tablet  Last Written Prescription Date:  02/06/2019  Last Fill Quantity: 30 tablet,  # refills: 5   Last Office Visit: 8/15/2018 Tay Hinojosa MD   Future Office Visit:    Next 5 appointments (look out 90 days)    Jul 29, 2019 10:00 AM CDT  PHYSICAL with MOISES Lizarraga CNP  [facility] [address] [id number] [phone]          30 tablet 5       There is no refill protocol information for this order     Routing refill request to provider for review/approval because:  Drug not on the [facility], [facility] or [facility] refill protocol or controlled substance       LORazepam (ATIVAN) 1 MG tablet  Last Written Prescription Date:  05/13/2019  Last Fill Quantity: 60 tablet,  # refills: 1   Last Office Visit: 8/15/2018 Tay Hinojosa MD   Future Office Visit:    Next 5 appointments (look out 90 days)    Jul 29, 2019 10:00 AM CDT  PHYSICAL with MOISES Lizarraga CNP  [facility] ([facility] [address] [id number] [phone]          60 tablet 1     Sig: Take 1 tablet (1 mg) by mouth every 6 hours       There is no refill protocol information for this order

## 2019-07-29 RX ORDER — LORAZEPAM 1 MG/1
1 TABLET ORAL EVERY 6 HOURS PRN
Qty: 60 TABLET | Refills: 0 | Status: SHIPPED | OUTPATIENT
Start: 2019-07-29 | End: 2019-09-04 | Stop reason: DRUGHIGH

## 2019-07-29 RX ORDER — ZOLPIDEM TARTRATE 12.5 MG/1
12.5 TABLET, FILM COATED, EXTENDED RELEASE ORAL
Qty: 30 TABLET | Refills: 0 | Status: SHIPPED | OUTPATIENT
Start: 2019-07-29 | End: 2019-09-04

## 2019-08-01 ENCOUNTER — TELEPHONE (OUTPATIENT)
Dept: PEDIATRICS | Facility: CLINIC | Age: 44
End: 2019-08-01

## 2019-08-01 NOTE — TELEPHONE ENCOUNTER
Reason for Call:  Other call back    Detailed comments: Pt looking for an appt with Dr. Hinojosa or AMBER Leonardo sooner than 8/20 related to Work Injury and Work Leave forms. Please call pt back to advice further.    Phone Number Patient can be reached at: Home number on file [phone] (home)    Best Time: Any time.     Can we leave a detailed message on this number? YES    Call taken on 8/1/2019 at 12:27 PM by Shirley Johns

## 2019-08-01 NOTE — TELEPHONE ENCOUNTER
Dial a 1 when dialing the number. I called the pt and the connection was bad and I was unable to hear him. When the pt calls back use the sb3 visit type and Dr. Hinojosa does have openings on 8/6/19.   Catherine Farris on 8/1/2019 at 12:44 PM

## 2019-08-01 NOTE — TELEPHONE ENCOUNTER
The pt is aware and scheduled for his new appointment on 8/6/19.   Catherine Farris on 8/1/2019 at 12:59 PM

## 2019-08-04 ENCOUNTER — MYC MEDICAL ADVICE (OUTPATIENT)
Dept: PEDIATRICS | Facility: CLINIC | Age: 44
End: 2019-08-04

## 2019-08-05 NOTE — TELEPHONE ENCOUNTER
Please see My Chart FYI message below  Hillary PEREZ RN   [facility]

## 2019-08-06 ENCOUNTER — OFFICE VISIT (OUTPATIENT)
Dept: PEDIATRICS | Facility: CLINIC | Age: 44
End: 2019-08-06
Payer: COMMERCIAL

## 2019-08-06 VITALS
DIASTOLIC BLOOD PRESSURE: 76 MMHG | SYSTOLIC BLOOD PRESSURE: 106 MMHG | BODY MASS INDEX: 27.63 KG/M2 | HEIGHT: 75 IN | TEMPERATURE: 98.6 F | RESPIRATION RATE: 16 BRPM | HEART RATE: 66 BPM | WEIGHT: 222.2 LBS | OXYGEN SATURATION: 100 %

## 2019-08-06 DIAGNOSIS — F41.1 ANXIETY STATE: Primary | ICD-10-CM

## 2019-08-06 PROCEDURE — 99215 OFFICE O/P EST HI 40 MIN: CPT | Performed by: INTERNAL MEDICINE

## 2019-08-06 RX ORDER — ESCITALOPRAM OXALATE 10 MG/1
10 TABLET ORAL DAILY
Qty: 30 TABLET | Refills: 1 | Status: SHIPPED | OUTPATIENT
Start: 2019-08-06 | End: 2019-10-13

## 2019-08-06 ASSESSMENT — MIFFLIN-ST. JEOR: SCORE: 1986.64

## 2019-08-06 NOTE — PATIENT INSTRUCTIONS
1. Change lorazepam to 1/2 tablets (0.5 mg) per dose   Goal of 1/2 tablet per day or less average by the end of the month   Then wean off during September    2. Start Lexapro (escitalopram) 10 mg daily    3. Set up counseling visits    4. Set up a psychiatry visit as well

## 2019-08-06 NOTE — PROGRESS NOTES
"Subjective     Refugio Wood is a 44 year old male who presents to clinic today for the following health issues:    HPI   Followup of anxiety    Taking Medication as prescribed: yes    Side Effects:  None    Medication Helping Symptoms:  Yes but long term he has concerns    Also concerns about sleep meds he is currently taking, looking to make changes.      Longstanding hx of anxiety.  Symptoms date back more than a decade. My first visit with him was in 2008 and he was already treated with medication at that time.    Currently is being treated with prn lorazepam.  Taking doses on average of 2 tablets per day.    Also using medications to help sleep. Without medications has significant difficulty both initiating sleep and staying asleep. He has been alternating trazodone and zolpidem to help with sleep.    He has started noting difficulties with his short term memory. Forgetful. Left his car running for 3 hours.   He is concerned that his memory difficulties may be related to his sleep medications.    Not currently involved with counseling. Has not seen a psychiatrist.    His anxiety and sleep issues cause him to feel very groggy and unable to focus.    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  He has great concern about the upcoming school year. He feels that his sx prohibit him from adequately performing his job duties. He discussed resignation with his HR department. They discussed having another KENIA to work toward improvement in his symptoms with the goal of RTW.    Reviewed several options to help with his mood and hopefully allow him to return to work.     Reviewed and updated as needed this visit by Provider  Tobacco  Allergies  Meds  Problems  Med Hx  Surg Hx  Fam Hx             Objective    /76 (BP Location: Right arm, Patient Position: Sitting, Cuff Size: Adult Regular)   Pulse 66   Temp 98.6  F (37  C) (Oral)   Resp 16   Ht 1.902 m (6' 2.88\")   Wt 100.8 kg (222 lb " 3.2 oz)   SpO2 100%   BMI 27.86 kg/m    Body mass index is 27.86 kg/m .  Physical Exam   GEN: no distress  PSYCH: Anxious affect. Well groomed. Good eye contact.             Assessment & Plan       ICD-10-CM    1. Anxiety state F41.1 escitalopram (LEXAPRO) 10 MG tablet     We had a lengthy conversation today regarding his symptoms and management options.  He feels that his sx are severe enough to prohibit him from working. He was going to resign his position, but now would like to try more intensive therapy and treatment to see if his symptoms will improve to the point where he can return to work.    Patient Instructions   1. Change lorazepam to 1/2 tablets (0.5 mg) per dose   Goal of 1/2 tablet per day or less average by the end of the month   Then wean off during September    2. Start Lexapro (escitalopram) 10 mg daily    3. Set up counseling visits    4. Set up a psychiatry visit as well      A total of 45 minutes was spent in face-to-face contact with Refugio today in clinic, of which >50% was for counseling of anxiety dx, management options and review and discussion regarding disability. Reviewed that counseling and psychiatry evaluation would be an intimate part of his leave.     Tay Hinojosa MD  [facility]

## 2019-08-08 ENCOUNTER — MYC MEDICAL ADVICE (OUTPATIENT)
Dept: PEDIATRICS | Facility: CLINIC | Age: 44
End: 2019-08-08

## 2019-08-08 NOTE — TELEPHONE ENCOUNTER
Please review the [facility] message from pt & advise. LOV was on 8/6/19.     Jacqueline CARRANZA, RN  Triage Nurse

## 2019-08-09 ENCOUNTER — TELEPHONE (OUTPATIENT)
Dept: PEDIATRICS | Facility: CLINIC | Age: 44
End: 2019-08-09

## 2019-08-09 NOTE — TELEPHONE ENCOUNTER
Reason for call:  Form   Our goal is to have forms completed within 72 hours, however some forms may require a visit or additional information.     Who is the form from? Patient  Where did the form come from? Patient or family brought in     What clinic location was the form placed at? [place]  Where was the form placed? 's Box  What number is listed as a contact on the form? [phone]  Date needed: as soon as possible  Patient will  at the clinic when completed  Has the patient signed a consent form for release of information? Not Applicable    Additional comments: Please complete form and contact patient at [phone] to come     Type of letter, form or note: [employer] form on [facility]    Phone number to reach patient:  Cell number on file:    Telephone Information:   Mobile [phone]       Best Time:  Any    Can we leave a detailed message on this number?  YES

## 2019-08-12 ENCOUNTER — VIRTUAL VISIT (OUTPATIENT)
Dept: PEDIATRICS | Facility: CLINIC | Age: 44
End: 2019-08-12
Payer: COMMERCIAL

## 2019-08-12 DIAGNOSIS — F51.01 PRIMARY INSOMNIA: ICD-10-CM

## 2019-08-12 DIAGNOSIS — F41.1 ANXIETY STATE: Primary | ICD-10-CM

## 2019-08-12 PROCEDURE — 99441 ZZC PHYSICIAN TELEPHONE EVALUATION 5-10 MIN: CPT | Performed by: INTERNAL MEDICINE

## 2019-08-12 NOTE — PROGRESS NOTES
Patient opted to conduct today's return visit via telephone vs an in person visit to the clinic.    I spoke with: Refugio Wood     The reason for the telephone visit was: Anxiety     Pertinent history and review of systems: evaluated last week for increased anxiety and insomnia.  Started Lexapro.  Finds some improvement in symptoms.  No significant side effects noted   Has been taking lorazepam 1 mg on average 2 times per day prior to last week.  Has been decreasing lorazepam amount.    Off work currently.   FMLA forms were completed last week.    Recommended that he schedule psychology and psychiatry visits. Neither has been scheduled yet, he has not yet checked with his insurance regarding clinics which would be covered.    Assessment:     ICD-10-CM    1. Anxiety state F41.1    2. Insomnia F51.01      For now, continue with current medications  Advised he should schedule psychology and psychiatry visits prior to the end of the week  Has f/u appt scheduled 9/4/19    Phone call contact time 5 minutes 10 seconds

## 2019-09-04 ENCOUNTER — OFFICE VISIT (OUTPATIENT)
Dept: PEDIATRICS | Facility: CLINIC | Age: 44
End: 2019-09-04
Payer: COMMERCIAL

## 2019-09-04 VITALS
OXYGEN SATURATION: 98 % | RESPIRATION RATE: 16 BRPM | HEART RATE: 61 BPM | DIASTOLIC BLOOD PRESSURE: 60 MMHG | BODY MASS INDEX: 30.8 KG/M2 | TEMPERATURE: 97 F | WEIGHT: 220 LBS | SYSTOLIC BLOOD PRESSURE: 120 MMHG | HEIGHT: 71 IN

## 2019-09-04 DIAGNOSIS — F51.01 PRIMARY INSOMNIA: ICD-10-CM

## 2019-09-04 DIAGNOSIS — F41.1 ANXIETY STATE: Primary | ICD-10-CM

## 2019-09-04 PROCEDURE — 99213 OFFICE O/P EST LOW 20 MIN: CPT | Performed by: INTERNAL MEDICINE

## 2019-09-04 RX ORDER — LORAZEPAM 0.5 MG/1
.5-1 TABLET ORAL EVERY 6 HOURS PRN
Qty: 30 TABLET | Refills: 0 | Status: SHIPPED | OUTPATIENT
Start: 2019-09-04 | End: 2019-10-09

## 2019-09-04 RX ORDER — ZOLPIDEM TARTRATE 12.5 MG/1
12.5 TABLET, FILM COATED, EXTENDED RELEASE ORAL
Qty: 30 TABLET | Refills: 1 | Status: SHIPPED | OUTPATIENT
Start: 2019-09-04 | End: 2020-05-07

## 2019-09-04 ASSESSMENT — MIFFLIN-ST. JEOR: SCORE: 1912.91

## 2019-09-04 ASSESSMENT — ANXIETY QUESTIONNAIRES
3. WORRYING TOO MUCH ABOUT DIFFERENT THINGS: MORE THAN HALF THE DAYS
1. FEELING NERVOUS, ANXIOUS, OR ON EDGE: SEVERAL DAYS
GAD7 TOTAL SCORE: 6
IF YOU CHECKED OFF ANY PROBLEMS ON THIS QUESTIONNAIRE, HOW DIFFICULT HAVE THESE PROBLEMS MADE IT FOR YOU TO DO YOUR WORK, TAKE CARE OF THINGS AT HOME, OR GET ALONG WITH OTHER PEOPLE: SOMEWHAT DIFFICULT
7. FEELING AFRAID AS IF SOMETHING AWFUL MIGHT HAPPEN: NOT AT ALL
6. BECOMING EASILY ANNOYED OR IRRITABLE: NOT AT ALL
5. BEING SO RESTLESS THAT IT IS HARD TO SIT STILL: NOT AT ALL
2. NOT BEING ABLE TO STOP OR CONTROL WORRYING: MORE THAN HALF THE DAYS

## 2019-09-04 ASSESSMENT — PATIENT HEALTH QUESTIONNAIRE - PHQ9
5. POOR APPETITE OR OVEREATING: SEVERAL DAYS
SUM OF ALL RESPONSES TO PHQ QUESTIONS 1-9: 5

## 2019-09-04 NOTE — PROGRESS NOTES
Subjective     Refugio Wood is a 44 year old male who presents to clinic today for the following health issues:    HPI   Anxiety Follow-Up    How are you doing with your anxiety since your last visit? Improved     Are you having other symptoms that might be associated with anxiety? Yes:  sleep issues    Have you had a significant life event? No     Are you feeling depressed? No    Do you have any concerns with your use of alcohol or other drugs? No    Social History     Tobacco Use     Smoking status: Never Smoker     Smokeless tobacco: Never Used   Substance Use Topics     Alcohol use: Yes     Alcohol/week: 0.0 oz     Comment: on the weekends     Drug use: No     TITO-7 SCORE 5/5/2017 7/10/2018 9/4/2019   Total Score - - -   Total Score 7 10 6     PHQ 5/5/2017 7/10/2018 9/4/2019   PHQ-9 Total Score 8 12 5   Q9: Thoughts of better off dead/self-harm past 2 weeks Not at all Not at all Not at all     Improvement noted since last OV.  Anxiety is much better controlled.  Has tolerated Lexapro 10 mg daily. Feels is helping.   Has been able to reduce lorazepam to 1 mg every 2-3 days.     Started working w/ a counselor, doing CBT. Has an appt w/ a psychiatrist scheduled in about 3 weeks.     Remains off work. Currently on FMLA through early November.   Does not feel ready to RTW at this time.     Has been using zolpidem for sleep. Feels trazodone is not working well.  Reinforced not taking zolpidem and lorazepam w/in at least 4 hours of each other.      How many servings of fruits and vegetables do you eat daily?  0-1    On average, how many sweetened beverages do you drink each day (soda, juice, sweet tea, etc)?   2    How many days per week do you miss taking your medication? 0    Few months back, ringing in ears, bothersome. A popping noise, and ringing for a minute, and concerns now.     Reviewed and updated as needed this visit by Provider  Tobacco  Allergies  Meds  Problems  Med Hx  Surg Hx  Fam Hx             "   Objective    /60 (BP Location: Right arm, Patient Position: Sitting, Cuff Size: Adult Regular)   Pulse 61   Temp 97  F (36.1  C) (Tympanic)   Resp 16   Ht 1.8 m (5' 10.87\")   Wt 99.8 kg (220 lb)   SpO2 98%   BMI 30.80 kg/m    Body mass index is 30.8 kg/m .  Physical Exam   GEN: no distress  PSYCH: Normal affect. Well groomed. Good eye contact.           Assessment & Plan       ICD-10-CM    1. Anxiety state F41.1 LORazepam (ATIVAN) 0.5 MG tablet   2. Insomnia F51.01 zolpidem ER (AMBIEN CR) 12.5 MG CR tablet        Patient Instructions   Continue with your current Lexapro dose    OK to stop trazodone    Change Lorazepam to 0.5 mg tablets      Tay Hinojosa MD  [facility]      "

## 2019-09-04 NOTE — PATIENT INSTRUCTIONS
Continue with your current Lexapro dose    OK to stop trazodone    Change Lorazepam to 0.5 mg tablets

## 2019-09-05 ASSESSMENT — ANXIETY QUESTIONNAIRES: GAD7 TOTAL SCORE: 6

## 2019-09-16 ENCOUNTER — MYC MEDICAL ADVICE (OUTPATIENT)
Dept: SLEEP MEDICINE | Facility: CLINIC | Age: 44
End: 2019-09-16

## 2019-10-07 ENCOUNTER — OFFICE VISIT (OUTPATIENT)
Dept: SLEEP MEDICINE | Facility: CLINIC | Age: 44
End: 2019-10-07
Payer: COMMERCIAL

## 2019-10-07 VITALS
BODY MASS INDEX: 27.48 KG/M2 | HEIGHT: 75 IN | DIASTOLIC BLOOD PRESSURE: 75 MMHG | OXYGEN SATURATION: 98 % | HEART RATE: 61 BPM | SYSTOLIC BLOOD PRESSURE: 118 MMHG | WEIGHT: 221 LBS

## 2019-10-07 DIAGNOSIS — Z72.820 LACK OF ADEQUATE SLEEP: ICD-10-CM

## 2019-10-07 DIAGNOSIS — R53.83 MALAISE AND FATIGUE: ICD-10-CM

## 2019-10-07 DIAGNOSIS — R53.81 MALAISE AND FATIGUE: ICD-10-CM

## 2019-10-07 DIAGNOSIS — G47.00 INSOMNIA, UNSPECIFIED TYPE: ICD-10-CM

## 2019-10-07 DIAGNOSIS — R06.89 DYSPNEA AND RESPIRATORY ABNORMALITY: ICD-10-CM

## 2019-10-07 DIAGNOSIS — R06.00 DYSPNEA AND RESPIRATORY ABNORMALITY: ICD-10-CM

## 2019-10-07 PROCEDURE — 99214 OFFICE O/P EST MOD 30 MIN: CPT | Performed by: PHYSICIAN ASSISTANT

## 2019-10-07 ASSESSMENT — MIFFLIN-ST. JEOR: SCORE: 1983.08

## 2019-10-07 NOTE — NURSING NOTE
"Chief Complaint   Patient presents with     RECHECK     f/u - waking up feeling exhausted. Insomnia- troubles falling asleep.        Initial /75   Pulse 61   Ht 1.905 m (6' 3\")   Wt 100.2 kg (221 lb)   SpO2 98%   BMI 27.62 kg/m   Estimated body mass index is 27.62 kg/m  as calculated from the following:    Height as of this encounter: 1.905 m (6' 3\").    Weight as of this encounter: 100.2 kg (221 lb).    Medication Reconciliation: complete      "

## 2019-10-07 NOTE — PROGRESS NOTES
"Sleep Follow-Up Visit:    Chief Complaint   Patient presents with     RECHECK     f/u - waking up feeling exhausted. Insomnia- troubles falling asleep.        He had a sleep study at [facility] in 2011. His AHI was 3.9/hr (4.2/hr supine) and RDI was 11/hr. His O2 mo was 87%. His PLM index was 35.5/hr. Many of the PLMs that were causing arousals were scored as RERAs. He weighed 230 pounds at that time.    Initial visit with [facility] was in 2017 and was evaluated by Bennet Goltz, PA-C. He presented with difficulty falling asleep. See full HPI dated 7/12/2017.    Today:   He presents because both his PCP and psychologist have advised him to undergo a sleep study to rule out sleep apnea.     He states the last 2-3 years dealing with difficulty falling asleep, RLS and un refreshing asleep. He reports fatigue and lack of energy all day, but then becomes more energetic at 8 PM.     He is taking Ambien CR 12.5 mg and Trazodone 100 mg on alternating weeks. He takes ZZZquil nightly. He feels Ambien works better. He took a Medical Cannabis for about 9 months and \"worked well\", waking up more refreshed. He stopped cannabis because he did not want to be on it in the long term.  He took Lorazepam 1 mg for about 10 years but discontinued     He was taking Gabapentin 600 mg for RLS, he did not feel any better during the day so stopped. He reports he found natural ways to deal with it.    He did not initiate treatment of delayed sleep phase syndrome with melatonin and SAD light as recommended by Bennett Goltz in 2017.     He goes to bed between 9-10 PM to read and watch [employer].  Usually it takes about 3-4 hours for him to fall asleep. He reports frequent arousals,  His mind is overactive. Wake time is typically 6:10 AM on weekedays. On weekends, he wakes up between 10:00 AM and 12 PM.  The patient is usually getting 5-6 hours of sleep per night on weekdays and 8 hours on weekends.     He reports that he snores " "loudly.     He does not feel rested in the morning.    Total score - [facility]: 1 (10/7/2019 12:00 PM)    PATY Total Score: 17    Past medical/surgical history, family history, social history, medications and allergies were reviewed.      Problem List:  Patient Active Problem List    Diagnosis Date Noted     Irritable bowel syndrome with diarrhea 03/30/2016     Priority: Medium     Restless leg 03/16/2012     Priority: Medium     Insomnia 03/08/2011     Priority: Medium     CARDIOVASCULAR SCREENING; LDL GOAL LESS THAN 160 02/10/2010     Priority: Medium     Anxiety state 01/10/2008     Priority: Medium        /75   Pulse 61   Ht 1.905 m (6' 3\")   Wt 100.2 kg (221 lb)   SpO2 98%   BMI 27.62 kg/m      Impression/Plan:  Snoring, frequent nocturnal arousal, non refreshing sleep and daytime fatigue. The STOP-BANG score is 3/8. We reviewed the pros and cons of Home Sleep Test versus an attended Polysomnogram. Will start evaluation for sleep apnea with a home sleep test.    Insomnia, sleep onset and sleep maintenance likely due to a variety of factors including anxiety, RLS and inadequate sleep hygiene.  Delayed sleep phase syndrome also playing a large roll.   We reviewed options.   He is interested in cognitive behavioral treatment and Sleep Psychology referral placed.   - Initiate light therapy (10,000 lux or lumen, natural spectrum, 30+ minutes in the morning) with melatonin for treatment of delayed sleep phase.  - Recommend taking Melatonin (3mg ) around dinner time rather than at bed time     The patient is instructed to follow up with me after the results of PSG are available to discuss the results and treatment options.     30 minutes spent with patient, all of which were spent face-to-face counseling, consulting, coordinating plan of care regarding sleep apnea and insomnia.      Chiara Gonzalez PA-C  "

## 2019-10-07 NOTE — PATIENT INSTRUCTIONS
Your BMI is Body mass index is 27.62 kg/m .  Weight management is a personal decision.  If you are interested in exploring weight loss strategies, the following discussion covers the approaches that may be successful. Body mass index (BMI) is one way to tell whether you are at a healthy weight, overweight, or obese. It measures your weight in relation to your height.  A BMI of 18.5 to 24.9 is in the healthy range. A person with a BMI of 25 to 29.9 is considered overweight, and someone with a BMI of 30 or greater is considered obese. More than two-thirds of American adults are considered overweight or obese.  Being overweight or obese increases the risk for further weight gain. Excess weight may lead to heart disease and diabetes.  Creating and following plans for healthy eating and physical activity may help you improve your health.  Weight control is part of healthy lifestyle and includes exercise, emotional health, and healthy eating habits. Careful eating habits lifelong are the mainstay of weight control. Though there are significant health benefits from weight loss, long-term weight loss with diet alone may be very difficult to achieve- studies show long-term success with dietary management in less than 10% of people. Attaining a healthy weight may be especially difficult to achieve in those with severe obesity. In some cases, medications, devices and surgical management might be considered.  What can you do?  If you are overweight or obese and are interested in methods for weight loss, you should discuss this with your provider.     Consider reducing daily calorie intake by 500 calories.     Keep a food journal.     Avoiding skipping meals, consider cutting portions instead.    Diet combined with exercise helps maintain muscle while optimizing fat loss. Strength training is particularly important for building and maintaining muscle mass. Exercise helps reduce stress, increase energy, and improves fitness.  Increasing exercise without diet control, however, may not burn enough calories to loose weight.       Start walking three days a week 10-20 minutes at a time    Work towards walking thirty minutes five days a week     Eventually, increase the speed of your walking for 1-2 minutes at time    In addition, we recommend that you review healthy lifestyles and methods for weight loss available through the National Institutes of Health patient information sites:  http://win.niddk.nih.gov/publications/index.htm    And look into health and wellness programs that may be available through your health insurance provider, employer, local community center, or susanne club.    Weight management plan: Patient was referred to their PCP to discuss a diet and exercise plan.    Instructions for treating Delayed Sleep Phase Syndrome:    Delayed Sleep Phase Syndrome (DSPS) means that your body's internal timing is set late compared to the 24 hour day. Therefore, it is often difficult to get up on time for work in the morning and sometimes difficult to fall asleep on time, in order to get enough sleep. People with DSPS often tend to like to stay up late on weekends and sleep in until between 10 AM and noon, sometimes even later.This is actually a bad habit that will perpetuate the problem. It reinforces your body's tendency to be on that later schedule.    You should go to bed when you are sleepy and ready to sleep. During this entire process, you should not engage in activities that may make it worse, such as watching TV in bed, leaving the TV on all night, drinking any caffeine 6 hours before bed or exercising 1-2 hours before bed.     Start taking Melatonin, 3 mg tablet 3-5 hours before the time that you fall asleep on average (not your desired bedtime or time that you get in bed, but the time you normally fall asleep on your own).     Upon awakening, get exposure to sun-light for about 30-45 minutes. You do not need to look at the sun,  in fact, this is dangerous. Reading the paper with the sun shining on you is adequate.  Alternatively, you may use a Seasonal Affective Disorder Lamp (intensity 10,000 Lux) instead of the sun. The lamp should be positioned 1-2 arms lengths away from you. They lamps are sold at Home Medical Companies such as [employer] or [employer]. A prescription can be written to get insurance coverage in some cases. They are also sold on [employer].    Using the light and melatonin should help march your internal clock (known as your circadian rhythms) gradually earlier. As your bedtime advances, remember to take your melatonin earlier, keeping it 5 hours before your fall asleep time.    Avoid naps and sleeping in because sleeping during the day will delay your body's clock and you will have to start from scratch.     More information about light therapy:  If the cost of any light box is too much, you can also purchase a compact fluorescent all spectrum light bulb at a local hardware store for about $8.  The most commonly available bulb is 1400 lumen.  You would need two of these positioned within 1 meter of yourself to be equivalent to 2,500 lux.  The bulbs can be placed in a standard light fixture.  Additionally, they can be placed in a mountable fixture that is used in greenhouses.  Mountable fixtures are also available at hardware stores for about $9.  Do not look directly at the light.  If you have any concerns regarding the safety of bright light therapy for you, it is recommended that you consult an ophthalmologist before using a light box.  If you have a condition that makes your eyes very sensitive to light, macular degeneration, a family history of such problems, or diabetic changes to your eyes, consult an ophthalmologist before using a light box. If you have anxiety disorder and have an increase in anxiety discontinue use.

## 2019-10-09 ENCOUNTER — MYC REFILL (OUTPATIENT)
Dept: PEDIATRICS | Facility: CLINIC | Age: 44
End: 2019-10-09

## 2019-10-09 DIAGNOSIS — K58.0 IRRITABLE BOWEL SYNDROME WITH DIARRHEA: ICD-10-CM

## 2019-10-09 DIAGNOSIS — F51.01 PRIMARY INSOMNIA: ICD-10-CM

## 2019-10-09 DIAGNOSIS — F41.1 ANXIETY STATE: ICD-10-CM

## 2019-10-09 DIAGNOSIS — G25.81 RESTLESS LEG SYNDROME: ICD-10-CM

## 2019-10-09 RX ORDER — ZOLPIDEM TARTRATE 12.5 MG/1
12.5 TABLET, FILM COATED, EXTENDED RELEASE ORAL
Qty: 30 TABLET | Refills: 1 | Status: CANCELLED | OUTPATIENT
Start: 2019-10-09

## 2019-10-09 RX ORDER — ESCITALOPRAM OXALATE 10 MG/1
10 TABLET ORAL DAILY
Qty: 30 TABLET | Refills: 1 | Status: CANCELLED | OUTPATIENT
Start: 2019-10-09

## 2019-10-11 RX ORDER — LORAZEPAM 0.5 MG/1
.5-1 TABLET ORAL EVERY 6 HOURS PRN
Qty: 30 TABLET | Refills: 0 | Status: SHIPPED | OUTPATIENT
Start: 2019-10-11 | End: 2022-08-11

## 2019-10-11 RX ORDER — DIPHENOXYLATE HCL/ATROPINE 2.5-.025MG
TABLET ORAL
Qty: 90 TABLET | Refills: 3 | Status: SHIPPED | OUTPATIENT
Start: 2019-10-11 | End: 2021-02-15

## 2019-10-11 RX ORDER — TRAZODONE HYDROCHLORIDE 100 MG/1
100 TABLET ORAL AT BEDTIME
Qty: 30 TABLET | Refills: 2 | Status: SHIPPED | OUTPATIENT
Start: 2019-10-11 | End: 2020-04-09

## 2019-10-11 NOTE — TELEPHONE ENCOUNTER
"    Requested Prescriptions   Pending Prescriptions Disp Refills     diphenoxylate-atropine (LOMOTIL) 2.5-0.025 MG tablet  Last Written Prescription Date:  2/6/19  Last Fill Quantity: 90,  # refills: 3  Last office visit: 9/4/2019 with prescribing provider:     Future Office Visit:   Next 5 appointments (look out 90 days)    Nov 04, 2019 10:45 AM CST  Office Visit with Tay Hinojosa MD, GURJIT EXAM ROOM 05  [facility] ([facility] [address] [place] [address]  [address]  [facility] [address]  [phone]          90 tablet 3     Sig: TAKE 1-2 TABLETS BY MOUTH THREE TIMES DAILY AS NEEDED FOR DIARRHEA       There is no refill protocol information for this order        traZODone (DESYREL) 100 MG tablet  Last Written Prescription Date:  06/18/19  Last Fill Quantity: 30,  # refills: 2   Last office visit: 9/4/2019 with prescribing provider:     Future Office Visit:   Next 5 appointments (look out 90 days)    Nov 04, 2019 10:45 AM CST  Office Visit with Tay Hinojosa MD, EA EXAM ROOM [address] [place] [address] ([facility] [address] [place] [address]  [address]  [facility] [address]  [phone]          30 tablet 2       Serotonin Modulators Passed - 10/9/2019 10:33 AM        Passed - Recent (12 mo) or future (30 days) visit within the authorizing provider's specialty     Patient has had an office visit with the authorizing provider or a provider within the authorizing providers department within the previous 12 mos or has a future within next 30 days. See \"Patient Info\" tab in inbasket, or \"Choose Columns\" in Meds & Orders section of the refill encounter.              Passed - Medication is active on med list        Passed - Patient is age 18 or older        escitalopram (LEXAPRO) 10 MG tablet  Last Written Prescription Date:  08/6/19  Last Fill Quantity: 30,  # refills: 2   Last office visit: 9/4/2019 with prescribing provider:     Future Office Visit:   Next 5 appointments " "(look out 90 days)    Nov 04, 2019 10:45 AM CST  Office Visit with Tay Hinojosa MD, GURJIT EXAM ROOM 05  [facility] ([facility]) [address] [place] [address]  [address]  [place] [id number] [phone]          30 tablet 1     Sig: Take 1 tablet (10 mg) by mouth daily       SSRIs Protocol Passed - 10/9/2019 10:33 AM        Passed - Recent (12 mo) or future (30 days) visit within the authorizing provider's specialty     Patient has had an office visit with the authorizing provider or a provider within the authorizing providers department within the previous 12 mos or has a future within next 30 days. See \"Patient Info\" tab in inbasket, or \"Choose Columns\" in Meds & Orders section of the refill encounter.              Passed - Medication is active on med list        Passed - Patient is age 18 or older        LORazepam (ATIVAN) 0.5 MG tablet  Last Written Prescription Date:  09/4/19  Last Fill Quantity: 30,  # refills: 0   Last office visit: 9/4/2019 with prescribing provider:     Future Office Visit:   Next 5 appointments (look out 90 days)    Nov 04, 2019 10:45 AM CST  Office Visit with Tay Hinojosa MD, EA EXAM ROOM 05  [facility] ([facility]) [address] [place] [address]  [address]  [place] [address] [phone]          30 tablet 0     Sig: Take 1-2 tablets (0.5-1 mg) by mouth every 6 hours as needed for anxiety       There is no refill protocol information for this order        zolpidem ER (AMBIEN CR) 12.5 MG CR tablet  Last Written Prescription Date:  09/4/19  Last Fill Quantity: 30,  # refills: 1   Last office visit: 9/4/2019 with prescribing provider:     Future Office Visit:   Next 5 appointments (look out 90 days)    Nov 04, 2019 10:45 AM CST  Office Visit with Tay Hinojosa MD, EA EXAM ROOM 05  [facility] ([facility]) [address] [place] [address]  [address]  [place] [id number] [phone]          30 tablet 1     Sig: " Take 1 tablet (12.5 mg) by mouth nightly as needed for sleep       There is no refill protocol information for this order

## 2019-10-28 DIAGNOSIS — F41.1 ANXIETY STATE: ICD-10-CM

## 2019-10-28 RX ORDER — ESCITALOPRAM OXALATE 10 MG/1
TABLET ORAL
Qty: 90 TABLET | Refills: 1 | Status: SHIPPED | OUTPATIENT
Start: 2019-10-28 | End: 2020-07-20

## 2019-10-28 NOTE — TELEPHONE ENCOUNTER
"Requested Prescriptions   Pending Prescriptions Disp Refills     escitalopram (LEXAPRO) 10 MG tablet [Pharmacy Med Name: ESCITALOPRAM 10MG TABLETS] 30 tablet 0     Sig: TAKE 1 TABLET BY MOUTH EVERY DAY       SSRIs Protocol Passed - 10/28/2019 12:33 PM        Passed - Recent (12 mo) or future (30 days) visit within the authorizing provider's specialty     Patient has had an office visit with the authorizing provider or a provider within the authorizing providers department within the previous 12 mos or has a future within next 30 days. See \"Patient Info\" tab in inbasket, or \"Choose Columns\" in Meds & Orders section of the refill encounter.              Passed - Medication is active on med list        Passed - Patient is age 18 or older        Prescription approved per [facility].  Plan at last office visit on 9/4 to continue current fish of lexapro-, but then filled for Lnly 30 days on 10/15 by RN.  Providing refills for 6 months.  Tisha Young RN  Message handled by Nurse Triage.    "

## 2019-11-02 ENCOUNTER — HEALTH MAINTENANCE LETTER (OUTPATIENT)
Age: 44
End: 2019-11-02

## 2019-11-04 ENCOUNTER — OFFICE VISIT (OUTPATIENT)
Dept: PEDIATRICS | Facility: CLINIC | Age: 44
End: 2019-11-04
Payer: COMMERCIAL

## 2019-11-04 VITALS
HEART RATE: 63 BPM | SYSTOLIC BLOOD PRESSURE: 138 MMHG | BODY MASS INDEX: 27.73 KG/M2 | RESPIRATION RATE: 16 BRPM | WEIGHT: 223 LBS | TEMPERATURE: 98.3 F | OXYGEN SATURATION: 96 % | DIASTOLIC BLOOD PRESSURE: 78 MMHG | HEIGHT: 75 IN

## 2019-11-04 DIAGNOSIS — F51.01 PRIMARY INSOMNIA: ICD-10-CM

## 2019-11-04 DIAGNOSIS — F41.1 ANXIETY STATE: Primary | ICD-10-CM

## 2019-11-04 PROCEDURE — 99214 OFFICE O/P EST MOD 30 MIN: CPT | Performed by: INTERNAL MEDICINE

## 2019-11-04 ASSESSMENT — ANXIETY QUESTIONNAIRES
GAD7 TOTAL SCORE: 7
6. BECOMING EASILY ANNOYED OR IRRITABLE: NOT AT ALL
3. WORRYING TOO MUCH ABOUT DIFFERENT THINGS: SEVERAL DAYS
1. FEELING NERVOUS, ANXIOUS, OR ON EDGE: MORE THAN HALF THE DAYS
7. FEELING AFRAID AS IF SOMETHING AWFUL MIGHT HAPPEN: NOT AT ALL
5. BEING SO RESTLESS THAT IT IS HARD TO SIT STILL: NOT AT ALL
IF YOU CHECKED OFF ANY PROBLEMS ON THIS QUESTIONNAIRE, HOW DIFFICULT HAVE THESE PROBLEMS MADE IT FOR YOU TO DO YOUR WORK, TAKE CARE OF THINGS AT HOME, OR GET ALONG WITH OTHER PEOPLE: SOMEWHAT DIFFICULT
2. NOT BEING ABLE TO STOP OR CONTROL WORRYING: MORE THAN HALF THE DAYS

## 2019-11-04 ASSESSMENT — PATIENT HEALTH QUESTIONNAIRE - PHQ9
SUM OF ALL RESPONSES TO PHQ QUESTIONS 1-9: 8
5. POOR APPETITE OR OVEREATING: MORE THAN HALF THE DAYS

## 2019-11-04 ASSESSMENT — MIFFLIN-ST. JEOR: SCORE: 1992.15

## 2019-11-04 NOTE — LETTER
[address]  [address]  [facility] [address]  [phone]      2019    Re:  Refugio Wood  :  1975                To Whom It May Concern:    I examined Refugio Wood in clinic on 2019. I feel that he is not yet ready to return to his previous position. He likely will need to be out of his position for the remainder of this calendar year.           Cordially,          Tay Hinojosa MD  Internal Medicine/Pediatrics

## 2019-11-04 NOTE — PROGRESS NOTES
Subjective     Refugio Wood is a 44 year old male who presents to clinic today for the following health issues:    HPI   Anxiety Follow-Up    How are you doing with your anxiety since your last visit? weaning off meds and may be a little higher due to medication changes.     Are you having other symptoms that might be associated with anxiety? Yes:  night time, sleep changes    Have you had a significant life event? No     Are you feeling depressed? No    Do you have any concerns with your use of alcohol or other drugs? No    Social History     Tobacco Use     Smoking status: Never Smoker     Smokeless tobacco: Never Used   Substance Use Topics     Alcohol use: Yes     Alcohol/week: 0.0 standard drinks     Comment: on the weekends     Drug use: No     TITO-7 SCORE 7/10/2018 9/4/2019 11/4/2019   Total Score - - -   Total Score 10 6 7     PHQ 7/10/2018 9/4/2019 11/4/2019   PHQ-9 Total Score 12 5 8   Q9: Thoughts of better off dead/self-harm past 2 weeks Not at all Not at all Not at all     Last PHQ-9 11/4/2019   1.  Little interest or pleasure in doing things 1   2.  Feeling down, depressed, or hopeless 1   3.  Trouble falling or staying asleep, or sleeping too much 2   4.  Feeling tired or having little energy 3   5.  Poor appetite or overeating 0   6.  Feeling bad about yourself 1   7.  Trouble concentrating 0   8.  Moving slowly or restless 0   Q9: Thoughts of better off dead/self-harm past 2 weeks 0   PHQ-9 Total Score 8   Difficulty at work, home, or with people Somewhat difficult     TITO-7  11/4/2019   1. Feeling nervous, anxious, or on edge 2   2. Not being able to stop or control worrying 2   3. Worrying too much about different things 1   4. Trouble relaxing 2   5. Being so restless that it is hard to sit still 0   6. Becoming easily annoyed or irritable 0   7. Feeling afraid, as if something awful might happen 0   TITO-7 Total Score 7   If you checked any problems, how difficult have they made it for you to  "do your work, take care of things at home, or get along with other people? Somewhat difficult     Remains out of work.  Feels that returning to a first shift job will not go well.  Over the past 2 years, he develops worsening insomnia w/ an early start, becomes sleep deprived then cannot perform his job well.     Since our last visit, he met w/ sleep specialist. Has a home sleep study set up for tomorrow, which was the first available time.  He has weaned down trazodone use. Is using zolpidem most nights.  Has been able to nearly fully stop lorazepam use.     He had an appointment scheduled with psychiatry at [facility]. He relates the day prior to his appt it was cancelled. He has not yet rescheduled an evaluation.     Nearing the end of his 12 weeks of FMLA. He does not plan to return to his previous position.    Is considering looking for alternate work, especially if he is able to find a 2nd shift position.       How many servings of fruits and vegetables do you eat daily?  2-3    On average, how many sweetened beverages do you drink each day (soda, juice, sweet tea, etc)?   2    How many days per week do you miss taking your medication? 0    Reviewed and updated as needed this visit by Provider  Tobacco  Allergies  Meds  Problems  Med Hx  Surg Hx  Fam Hx           Objective    /78 (BP Location: Right arm, Patient Position: Sitting, Cuff Size: Adult Regular)   Pulse 63   Temp 98.3  F (36.8  C) (Oral)   Resp 16   Ht 1.905 m (6' 3\")   Wt 101.2 kg (223 lb)   SpO2 96%   BMI 27.87 kg/m    Body mass index is 27.87 kg/m .  Physical Exam   GEN: no distress  PSYCH: Normal affect. Well groomed. Good eye contact.         Assessment & Plan       ICD-10-CM    1. Anxiety state F41.1    2. Insomnia F51.01      For now, continue with your current medications    Complete the scheduled sleep study    Call to set up a visit with a psychiatrist    Follow-up with me in 2-3 months, sooner if needed    A total of 25 " minutes was spent in face-to-face contact with Refugio frye in clinic, of which >50% was for counseling of anxiety and insomnia management.    Tay Hinojosa MD  [facility]

## 2019-11-04 NOTE — PATIENT INSTRUCTIONS
For now, continue with your current medications    Complete the scheduled sleep study    Call to set up a visit with a psychiatrist    Follow-up with me in 2-3 months, sooner if needed

## 2019-11-05 ENCOUNTER — OFFICE VISIT (OUTPATIENT)
Dept: SLEEP MEDICINE | Facility: CLINIC | Age: 44
End: 2019-11-05
Payer: COMMERCIAL

## 2019-11-05 ENCOUNTER — MYC MEDICAL ADVICE (OUTPATIENT)
Dept: PEDIATRICS | Facility: CLINIC | Age: 44
End: 2019-11-05

## 2019-11-05 DIAGNOSIS — Z72.820 LACK OF ADEQUATE SLEEP: ICD-10-CM

## 2019-11-05 DIAGNOSIS — R53.81 MALAISE AND FATIGUE: ICD-10-CM

## 2019-11-05 DIAGNOSIS — R53.83 MALAISE AND FATIGUE: ICD-10-CM

## 2019-11-05 DIAGNOSIS — R06.00 DYSPNEA AND RESPIRATORY ABNORMALITY: ICD-10-CM

## 2019-11-05 DIAGNOSIS — R06.89 DYSPNEA AND RESPIRATORY ABNORMALITY: ICD-10-CM

## 2019-11-05 DIAGNOSIS — G47.00 INSOMNIA, UNSPECIFIED TYPE: ICD-10-CM

## 2019-11-05 PROCEDURE — G0399 HOME SLEEP TEST/TYPE 3 PORTA: HCPCS | Performed by: INTERNAL MEDICINE

## 2019-11-05 ASSESSMENT — ANXIETY QUESTIONNAIRES: GAD7 TOTAL SCORE: 7

## 2019-11-05 NOTE — PROGRESS NOTES
Pt is completing a home sleep test. Pt was instructed on how to put on the Noxturnal T3 device and associated equipment before going to bed and given the opportunity to practice putting it on before leaving the sleep center. Pt was reminded to bring the home sleep test kit back to the center tomorrow, at agreed upon time for download and reporting.       Anny Moses MA on 11/5/2019 at 2:20 PM

## 2019-11-06 ENCOUNTER — DOCUMENTATION ONLY (OUTPATIENT)
Dept: SLEEP MEDICINE | Facility: CLINIC | Age: 44
End: 2019-11-06
Payer: COMMERCIAL

## 2019-11-06 NOTE — TELEPHONE ENCOUNTER
FYI.    See mychart note.    Fatuma Bryant, RACHELLE, RN, PHN  [facility] Triage

## 2019-11-06 NOTE — PROGRESS NOTES
Pt returned HST device. It was downloaded and forwarded data to the clinical specialist for scoring.      Anny Moses MA on 11/6/2019 at 10:55 AM

## 2019-11-06 NOTE — PROGRESS NOTES
This HSAT was performed using a Noxturnal T3 device which recorded snore, sound, movement activity, body position, nasal pressure, oronasal thermal airflow, pulse, oximetry and both chest and abdominal respiratory effort. HSAT data was restricted to the time patient states they were in bed.     HSAT was scored using 1B 4% hypopnea rule.     HST AHI (Non-PAT): 1.5  Snoring was reported as intermittent.  Time with SpO2 below 89% was 0 minutes.   Overall signal quality was good     Pt will follow up with sleep provider to determine appropriate therapy.

## 2019-11-07 ENCOUNTER — OFFICE VISIT (OUTPATIENT)
Dept: SLEEP MEDICINE | Facility: CLINIC | Age: 44
End: 2019-11-07
Payer: COMMERCIAL

## 2019-11-07 VITALS
HEIGHT: 75 IN | HEART RATE: 56 BPM | WEIGHT: 223 LBS | BODY MASS INDEX: 27.73 KG/M2 | DIASTOLIC BLOOD PRESSURE: 78 MMHG | SYSTOLIC BLOOD PRESSURE: 133 MMHG | OXYGEN SATURATION: 97 %

## 2019-11-07 DIAGNOSIS — R06.83 SNORING: Primary | ICD-10-CM

## 2019-11-07 PROCEDURE — 99214 OFFICE O/P EST MOD 30 MIN: CPT | Performed by: PHYSICIAN ASSISTANT

## 2019-11-07 ASSESSMENT — MIFFLIN-ST. JEOR: SCORE: 1992.15

## 2019-11-07 NOTE — PROGRESS NOTES
"Home Sleep Apnea Testing Results Visit:    Chief Complaint   Patient presents with     Study Results       Refugio Wood is a 44 year old male who returns to [facility] after having had Home Sleep Apnea Testing.  He presented with snoring, frequent nocturnal arousal, non refreshing sleep and daytime fatigue. Head of bed was elevated.     Total score - [place]: 3 (11/7/2019  9:00 AM)   STOP-BANG: 3/8  PATY Total Score: 18    Home Sleep Apnea Testing - 11/5/19: 221 lbs 0 oz: AHI 1.5/hr. Supine AHI 1.6/hr.   Oxygen Theo of 88%.  Baseline 92.8%.  Sp02 =< 88% for 0 minutes  He slept on his back (94.6%), prone (0.0%), left (0.0) and right (4.9%) sides.   Analysis time: 503.6 minutes.     Signal quality of Oxymeter 100% Good  Nasal Cannula 100% Good  RIP belts 100% Good.     Refugio Wood reports that he slept Fair.     Home Sleep Apnea Testing was reviewed in detail today with Refugio and a copy given to him for his records.    Past medical/surgical history, family history, social history, medications and allergies were reviewed.      /78   Pulse 56   Ht 1.905 m (6' 3\")   Wt 101.2 kg (223 lb)   SpO2 97%   BMI 27.87 kg/m      Impression/Plan:  No evidence of severe obstructive sleep apnea in the supine position.  Sleep associated hypoxemia was not present.     Options discussed today including an attended polysomnography.  Elected to concentrate on  treatment of insomnia. He will follow up with Dr. Newell for cognitive behavioral treatment of insomnia.      25 minutes spent with patient with >50% spent in counseling and coordination of care.      Chiara Gonzalez PA-C      "

## 2019-11-07 NOTE — PATIENT INSTRUCTIONS
Your BMI is Body mass index is 27.87 kg/m .  Weight management is a personal decision.  If you are interested in exploring weight loss strategies, the following discussion covers the approaches that may be successful. Body mass index (BMI) is one way to tell whether you are at a healthy weight, overweight, or obese. It measures your weight in relation to your height.  A BMI of 18.5 to 24.9 is in the healthy range. A person with a BMI of 25 to 29.9 is considered overweight, and someone with a BMI of 30 or greater is considered obese. More than two-thirds of American adults are considered overweight or obese.  Being overweight or obese increases the risk for further weight gain. Excess weight may lead to heart disease and diabetes.  Creating and following plans for healthy eating and physical activity may help you improve your health.  Weight control is part of healthy lifestyle and includes exercise, emotional health, and healthy eating habits. Careful eating habits lifelong are the mainstay of weight control. Though there are significant health benefits from weight loss, long-term weight loss with diet alone may be very difficult to achieve- studies show long-term success with dietary management in less than 10% of people. Attaining a healthy weight may be especially difficult to achieve in those with severe obesity. In some cases, medications, devices and surgical management might be considered.  What can you do?  If you are overweight or obese and are interested in methods for weight loss, you should discuss this with your provider.     Consider reducing daily calorie intake by 500 calories.     Keep a food journal.     Avoiding skipping meals, consider cutting portions instead.    Diet combined with exercise helps maintain muscle while optimizing fat loss. Strength training is particularly important for building and maintaining muscle mass. Exercise helps reduce stress, increase energy, and improves fitness.  Increasing exercise without diet control, however, may not burn enough calories to loose weight.       Start walking three days a week 10-20 minutes at a time    Work towards walking thirty minutes five days a week     Eventually, increase the speed of your walking for 1-2 minutes at time    In addition, we recommend that you review healthy lifestyles and methods for weight loss available through the National Institutes of Health patient information sites:  http://win.niddk.nih.gov/publications/index.htm    And look into health and wellness programs that may be available through your health insurance provider, employer, local community center, or susanne club.    Weight management plan: Patient was referred to their PCP to discuss a diet and exercise plan.

## 2019-11-07 NOTE — NURSING NOTE
"Chief Complaint   Patient presents with     Study Results       Initial /78   Pulse 56   Ht 1.905 m (6' 3\")   Wt 101.2 kg (223 lb)   SpO2 97%   BMI 27.87 kg/m   Estimated body mass index is 27.87 kg/m  as calculated from the following:    Height as of this encounter: 1.905 m (6' 3\").    Weight as of this encounter: 101.2 kg (223 lb).    Medication Reconciliation: complete      "

## 2019-11-13 NOTE — PROCEDURES
"HOME SLEEP STUDY INTERPRETATION    Patient: Refugio Wood  MRN: [id number]  YOB: 1975  Study Date: 11/5/2019  Referring Provider: Tay Hinojosa  Ordering Provider: FARHAN Moreau     Indications for Home Study: Refugio Wood is a 44 year old male with symptoms suggestive of obstructive sleep apnea.    Estimated body mass index is 27.87 kg/m  as calculated from the following:    Height as of 11/7/19: 1.905 m (6' 3\").    Weight as of 11/7/19: 101.2 kg (223 lb).  Total score - [place]: 3 (11/7/2019  9:00 AM)  STOP-BANG: 3/8    Data: A full night home sleep study was performed recording the standard physiologic parameters including body position, movement, sound, nasal pressure, thermal oral airflow, chest and abdominal movements with respiratory inductance plethysmography, and oxygen saturation by pulse oximetry. Pulse rate was estimated by oximetry recording. This study was considered adequate based on > 4 hours of quality oximetry and respiratory recording. As specified by the AASM Manual for the Scoring of Sleep and Associated events, version 2.3, Rule VIII.D 1B, 4% oxygen desaturation scoring for hypopneas is used as a standard of care on all home sleep apnea testing.    Analysis Time:  503 minutes    Respiration: Head of bed reported elevated  Sleep Associated Hypoxemia: sustained hypoxemia was not present. Baseline oxygen saturation was 93%.  Time with saturation less than or equal to 88% was 0 minutes. The lowest oxygen saturation was 88%.   Snoring: Snoring was present on microphone tracing but not heard during review.  Respiratory events: The home study revealed a presence of 2 obstructive apneas and 0 mixed and central apneas. There were 11 hypopneas resulting in a combined apnea/hypopnea index [AHI] of 1.5 events per hour.  AHI was 1.6 per hour supine, n/a per hour prone, n/a per hour on left side, and 0 per hour on right side.   Pattern: Excluding events noted above, respiratory rate and " pattern was Normal.    Position: Percent of time spent: supine - 95%, prone - 0%, on left - 0%, on right - 5%.    Heart Rate: By pulse oximetry normal rate was noted.     Assessment:   Np evidence of severe obstructive sleep apnea.  Sleep associated hypoxemia was not present.  Some periodic movements were present    Recommendations:  Consider attended polysomnogram if clinical suspicion of obstructive sleep apnea is moderate or high.  Suggest optimizing sleep hygiene  Weight management.    Diagnosis Code(s): Snoring R06.83    Garth Trjeo MD, November 13, 2019   Diplomate, American Board of Internal Medicine, Sleep Medicine

## 2020-01-04 NOTE — TELEPHONE ENCOUNTER
RX faxed. Copy kept at station.   BRENTON Palomares June 9, 2017 2:31 PM     
Rx refilled. In my outbasket.   
zolpidem (AMBIEN CR) 12.5 MG      Last Written Prescription Date:  5/5/17  Last Fill Quantity: 30,   # refills: 5  [place] [facility] prescribing provider: 5/5/17  Future Office visit:       Routing refill request to provider for review/approval because:  Drug not on the [facility] or [facility] refill protocol or controlled substance    
unknown

## 2020-04-09 DIAGNOSIS — F51.01 PRIMARY INSOMNIA: ICD-10-CM

## 2020-04-09 RX ORDER — TRAZODONE HYDROCHLORIDE 100 MG/1
TABLET ORAL
Qty: 30 TABLET | Refills: 2 | Status: SHIPPED | OUTPATIENT
Start: 2020-04-09 | End: 2020-11-21

## 2020-04-09 NOTE — TELEPHONE ENCOUNTER
Prescription approved per [facility] Refill Protocol.  Maricarmen AGUILAR RN, BSN

## 2020-05-06 DIAGNOSIS — F51.01 PRIMARY INSOMNIA: ICD-10-CM

## 2020-05-07 RX ORDER — ZOLPIDEM TARTRATE 12.5 MG/1
TABLET, FILM COATED, EXTENDED RELEASE ORAL
Qty: 30 TABLET | Refills: 1 | Status: SHIPPED | OUTPATIENT
Start: 2020-05-07 | End: 2020-08-30

## 2020-07-18 DIAGNOSIS — F41.1 ANXIETY STATE: ICD-10-CM

## 2020-07-20 RX ORDER — ESCITALOPRAM OXALATE 10 MG/1
TABLET ORAL
Qty: 90 TABLET | Refills: 1 | Status: SHIPPED | OUTPATIENT
Start: 2020-07-20 | End: 2020-11-21

## 2020-07-20 NOTE — TELEPHONE ENCOUNTER
Routing refill request to provider for review/approval because:  A break in medication    Fatuma Romero, RN   [facility] -- Triage Nurse

## 2020-08-29 DIAGNOSIS — F51.01 PRIMARY INSOMNIA: ICD-10-CM

## 2020-08-30 RX ORDER — ZOLPIDEM TARTRATE 12.5 MG/1
TABLET, FILM COATED, EXTENDED RELEASE ORAL
Qty: 30 TABLET | Refills: 1 | Status: SHIPPED | OUTPATIENT
Start: 2020-08-30 | End: 2020-11-21

## 2020-11-16 ENCOUNTER — HEALTH MAINTENANCE LETTER (OUTPATIENT)
Age: 45
End: 2020-11-16

## 2020-11-21 DIAGNOSIS — F51.01 PRIMARY INSOMNIA: ICD-10-CM

## 2020-11-21 DIAGNOSIS — F41.1 ANXIETY STATE: ICD-10-CM

## 2020-11-21 NOTE — TELEPHONE ENCOUNTER
Pa request for zolpidem ER (AMBIEN CR) 12.5 MG CR tablet    Pt I.D. [phone] call plan at [phone]

## 2020-11-23 RX ORDER — ESCITALOPRAM OXALATE 10 MG/1
10 TABLET ORAL DAILY
Qty: 90 TABLET | Refills: 0 | Status: SHIPPED | OUTPATIENT
Start: 2020-11-23 | End: 2021-03-01

## 2020-11-23 RX ORDER — ZOLPIDEM TARTRATE 12.5 MG/1
TABLET, FILM COATED, EXTENDED RELEASE ORAL
Qty: 30 TABLET | Refills: 1 | Status: SHIPPED | OUTPATIENT
Start: 2020-11-23 | End: 2021-03-02

## 2020-11-23 RX ORDER — TRAZODONE HYDROCHLORIDE 100 MG/1
TABLET ORAL
Qty: 30 TABLET | Refills: 2 | Status: SHIPPED | OUTPATIENT
Start: 2020-11-23 | End: 2021-03-02

## 2020-11-23 NOTE — TELEPHONE ENCOUNTER
Routing refill request to provider for review/approval because:  Drug not on the [facility] refill protocol   Patient needs to be seen because it has been more than 1 year since last office visit.    Lakeisha Anderson RN on 11/23/2020 at 9:00 AM

## 2021-01-20 NOTE — TELEPHONE ENCOUNTER
Lorazepam 1mg      Last Written Prescription Date: 9/6/17  Last Fill Quantity: 60,  # refills: 0   [place] [facility] Visit with [facility] or [facility] prescribing provider: 3/30/16 with Dr. Hinojosa                                               
Rx faxed.    Please call: He needs an OV w/ me prior to next refill (MyChart note not read from last month)  
Sent [employer] message and tried to call again, still no voicemail set up.  
Unable to Leave a voice mail.  No voice mail box set up.    Julianne Bryan      
(4) no impairment

## 2021-02-13 DIAGNOSIS — G25.81 RESTLESS LEG SYNDROME: ICD-10-CM

## 2021-02-13 DIAGNOSIS — K58.0 IRRITABLE BOWEL SYNDROME WITH DIARRHEA: ICD-10-CM

## 2021-02-15 ENCOUNTER — MYC MEDICAL ADVICE (OUTPATIENT)
Dept: PEDIATRICS | Facility: CLINIC | Age: 46
End: 2021-02-15

## 2021-02-15 DIAGNOSIS — F41.1 ANXIETY STATE: ICD-10-CM

## 2021-02-15 RX ORDER — DIPHENOXYLATE HCL/ATROPINE 2.5-.025MG
TABLET ORAL
Qty: 90 TABLET | Refills: 0 | Status: SHIPPED | OUTPATIENT
Start: 2021-02-15 | End: 2021-05-19

## 2021-02-15 NOTE — TELEPHONE ENCOUNTER
Patient overdue for appointment. Schedule with Dr. Hinojosa in the next two months.   Reynaldo Lauren PA-C

## 2021-02-22 NOTE — TELEPHONE ENCOUNTER
Called patient and left a message informing him prescription cannot be filled until he is scheduled for an appointment.  Requested a call back for scheduling, and provided direct number.    Annabella Mohan

## 2021-02-22 NOTE — TELEPHONE ENCOUNTER
Routing refill request to provider for review/approval because:  Maribel given x1 and patient did not follow up, please advise  Patient needs to be seen because it has been more than 1 year since last office visit.    Lakeisha Anderson RN on 2/22/2021 at 9:40 AM

## 2021-03-01 RX ORDER — ESCITALOPRAM OXALATE 10 MG/1
10 TABLET ORAL DAILY
Qty: 30 TABLET | Refills: 0 | Status: SHIPPED | OUTPATIENT
Start: 2021-03-01 | End: 2021-03-02

## 2021-03-01 NOTE — TELEPHONE ENCOUNTER
Patient has phone appointment scheduled for 03/02/2021 at 0940 with Adalgisa Lou PA-C at [facility].    Mickey Oquendo at 11:55 AM on 3/1/2021  [facility] Health Guide  Phone [phone]

## 2021-03-02 ENCOUNTER — MYC MEDICAL ADVICE (OUTPATIENT)
Dept: FAMILY MEDICINE | Facility: CLINIC | Age: 46
End: 2021-03-02

## 2021-03-02 ENCOUNTER — VIRTUAL VISIT (OUTPATIENT)
Dept: FAMILY MEDICINE | Facility: CLINIC | Age: 46
End: 2021-03-02
Payer: COMMERCIAL

## 2021-03-02 DIAGNOSIS — F51.01 PRIMARY INSOMNIA: ICD-10-CM

## 2021-03-02 DIAGNOSIS — F41.1 ANXIETY STATE: ICD-10-CM

## 2021-03-02 PROCEDURE — 99213 OFFICE O/P EST LOW 20 MIN: CPT | Mod: 95 | Performed by: PHYSICIAN ASSISTANT

## 2021-03-02 RX ORDER — FINASTERIDE 5 MG/1
0.25 TABLET, FILM COATED ORAL DAILY
COMMUNITY
Start: 2020-12-16 | End: 2024-08-28

## 2021-03-02 RX ORDER — TRAZODONE HYDROCHLORIDE 100 MG/1
TABLET ORAL
Qty: 30 TABLET | Refills: 1 | Status: SHIPPED | OUTPATIENT
Start: 2021-03-02 | End: 2021-06-24

## 2021-03-02 RX ORDER — ZOLPIDEM TARTRATE 12.5 MG/1
TABLET, FILM COATED, EXTENDED RELEASE ORAL
Qty: 30 TABLET | Refills: 0 | Status: SHIPPED | OUTPATIENT
Start: 2021-03-02 | End: 2021-10-01

## 2021-03-02 RX ORDER — ESCITALOPRAM OXALATE 10 MG/1
10 TABLET ORAL DAILY
Qty: 90 TABLET | Refills: 1 | Status: SHIPPED | OUTPATIENT
Start: 2021-03-02 | End: 2021-09-03

## 2021-03-02 ASSESSMENT — ANXIETY QUESTIONNAIRES
7. FEELING AFRAID AS IF SOMETHING AWFUL MIGHT HAPPEN: NOT AT ALL
6. BECOMING EASILY ANNOYED OR IRRITABLE: NOT AT ALL
5. BEING SO RESTLESS THAT IT IS HARD TO SIT STILL: NEARLY EVERY DAY
2. NOT BEING ABLE TO STOP OR CONTROL WORRYING: NOT AT ALL
1. FEELING NERVOUS, ANXIOUS, OR ON EDGE: NOT AT ALL
GAD7 TOTAL SCORE: 3
IF YOU CHECKED OFF ANY PROBLEMS ON THIS QUESTIONNAIRE, HOW DIFFICULT HAVE THESE PROBLEMS MADE IT FOR YOU TO DO YOUR WORK, TAKE CARE OF THINGS AT HOME, OR GET ALONG WITH OTHER PEOPLE: NOT DIFFICULT AT ALL
3. WORRYING TOO MUCH ABOUT DIFFERENT THINGS: NOT AT ALL

## 2021-03-02 ASSESSMENT — PATIENT HEALTH QUESTIONNAIRE - PHQ9: 5. POOR APPETITE OR OVEREATING: NOT AT ALL

## 2021-03-02 NOTE — PROGRESS NOTES
Refugio is a 45 year old who is being evaluated via a billable telephone visit.      What phone number would you like to be contacted at? [phone]  How would you like to obtain your AVS? MyChart    Assessment & Plan     Insomnia  - Seep Management referral given  - traZODone (DESYREL) 100 MG tablet; TAKE 1 TABLET(100 MG) BY MOUTH AT BEDTIME AS NEEDED FOR SLEEP  - zolpidem ER (AMBIEN CR) 12.5 MG CR tablet; TAKE 1 TABLET(12.5 MG) BY MOUTH EVERY NIGHT AS NEEDED FOR SLEEP    Anxiety  - escitalopram (LEXAPRO) 10 MG tablet; Take 1 tablet (10 mg) by mouth daily Visit needed prior to additional refills.        Return Per Sleep specialty recommendation.    Adalgisa Lou PA-C  [facility]    Skye Huff is a 45 year old who presents for the following health issues  accompanied by his self:    HPI       Anxiety Follow-Up    How are you doing with your anxiety since your last visit? Improved     Are you having other symptoms that might be associated with anxiety? No    Have you had a significant life event? No     Are you feeling depressed? No    Do you have any concerns with your use of alcohol or other drugs? No    Social History     Tobacco Use     Smoking status: Never Smoker     Smokeless tobacco: Never Used   Substance Use Topics     Alcohol use: Yes     Alcohol/week: 0.0 standard drinks     Comment: on the weekends     Drug use: No     TITO-7 SCORE 9/4/2019 11/4/2019 3/2/2021   Total Score - - -   Total Score 6 7 3     PHQ 7/10/2018 9/4/2019 11/4/2019   PHQ-9 Total Score 12 5 8   Q9: Thoughts of better off dead/self-harm past 2 weeks Not at all Not at all Not at all           How many servings of fruits and vegetables do you eat daily?  1-2    On average, how many sweetened beverages do you drink each day (Examples: soda, juice, sweet tea, etc.  Do NOT count diet or artificially sweetened beverages)?   2 cans energy drink    How many days per week do you exercise enough to make your  heart beat faster? 4-5    How many minutes a day do you exercise enough to make your heart beat faster? 60 or more    How many days per week do you miss taking your medication? 0    Did review that sleep medication use needs to be weaned.  Patient not amenable to this but will consult with sleep management.  Referral active.     Review of Systems   Constitutional, HEENT, cardiovascular, pulmonary, gi and gu systems are negative, except as otherwise noted.      Objective           Vitals:  No vitals were obtained today due to virtual visit.    Physical Exam   healthy, alert and no distress  PSYCH: Alert and oriented times 3; coherent speech, normal   rate and volume, able to articulate logical thoughts, able   to abstract reason, no tangential thoughts, no hallucinations   or delusions  His affect is normal  RESP: No cough, no audible wheezing, able to talk in full sentences  Remainder of exam unable to be completed due to telephone visits                Phone call duration: 15 minutes

## 2021-03-03 ASSESSMENT — ANXIETY QUESTIONNAIRES: GAD7 TOTAL SCORE: 3

## 2021-05-19 DIAGNOSIS — K58.0 IRRITABLE BOWEL SYNDROME WITH DIARRHEA: ICD-10-CM

## 2021-05-19 DIAGNOSIS — G25.81 RESTLESS LEG SYNDROME: ICD-10-CM

## 2021-05-19 RX ORDER — DIPHENOXYLATE HCL/ATROPINE 2.5-.025MG
TABLET ORAL
Qty: 90 TABLET | Refills: 0 | Status: SHIPPED | OUTPATIENT
Start: 2021-05-19 | End: 2021-09-24

## 2021-05-19 NOTE — TELEPHONE ENCOUNTER
Routing refill request to provider for review/approval because:  Drug not on the [facility] refill protocol     Fatuma Romero RN   [facility] -- Triage Nurse

## 2021-06-24 DIAGNOSIS — F51.01 PRIMARY INSOMNIA: ICD-10-CM

## 2021-06-24 RX ORDER — TRAZODONE HYDROCHLORIDE 100 MG/1
TABLET ORAL
Qty: 30 TABLET | Refills: 5 | Status: SHIPPED | OUTPATIENT
Start: 2021-06-24 | End: 2022-05-25

## 2021-06-24 NOTE — TELEPHONE ENCOUNTER
Routing refill request to provider for review/approval because:  Patient needs to be seen because it has been more than 1 year since last office visit.    Patient was seen by Dr. Lou ([facility]) regarding insomnia 3/2/21 but has not seen PCP for a year.     Angel Luis KENT RN

## 2021-09-02 DIAGNOSIS — F41.1 ANXIETY STATE: ICD-10-CM

## 2021-09-03 RX ORDER — ESCITALOPRAM OXALATE 10 MG/1
10 TABLET ORAL DAILY
Qty: 30 TABLET | Refills: 0 | Status: SHIPPED | OUTPATIENT
Start: 2021-09-03 | End: 2021-10-08

## 2021-09-03 NOTE — TELEPHONE ENCOUNTER
Routing refill request to provider for review/approval because:  Drug interaction warning    Fatuma Romero RN   [facility]  -- Triage Nurse

## 2021-09-12 ENCOUNTER — HEALTH MAINTENANCE LETTER (OUTPATIENT)
Age: 46
End: 2021-09-12

## 2021-09-24 DIAGNOSIS — K58.0 IRRITABLE BOWEL SYNDROME WITH DIARRHEA: ICD-10-CM

## 2021-09-24 DIAGNOSIS — G25.81 RESTLESS LEG SYNDROME: ICD-10-CM

## 2021-09-24 RX ORDER — DIPHENOXYLATE HCL/ATROPINE 2.5-.025MG
TABLET ORAL
Qty: 90 TABLET | Refills: 1 | Status: SHIPPED | OUTPATIENT
Start: 2021-09-24 | End: 2022-01-17

## 2021-09-24 NOTE — TELEPHONE ENCOUNTER
Routing refill request to provider for review/approval because:  Drug not on the [facility] refill protocol     Please advise if ongoing script    Angel Luis KENT RN

## 2021-09-30 DIAGNOSIS — F51.01 PRIMARY INSOMNIA: ICD-10-CM

## 2021-10-01 RX ORDER — ZOLPIDEM TARTRATE 12.5 MG/1
TABLET, FILM COATED, EXTENDED RELEASE ORAL
Qty: 30 TABLET | Refills: 2 | Status: SHIPPED | OUTPATIENT
Start: 2021-10-01 | End: 2022-02-14

## 2021-10-06 DIAGNOSIS — F41.1 ANXIETY STATE: ICD-10-CM

## 2021-10-08 RX ORDER — ESCITALOPRAM OXALATE 10 MG/1
TABLET ORAL
Qty: 30 TABLET | Refills: 0 | Status: SHIPPED | OUTPATIENT
Start: 2021-10-08 | End: 2022-08-11

## 2022-01-02 ENCOUNTER — HEALTH MAINTENANCE LETTER (OUTPATIENT)
Age: 47
End: 2022-01-02

## 2022-01-16 DIAGNOSIS — K58.0 IRRITABLE BOWEL SYNDROME WITH DIARRHEA: ICD-10-CM

## 2022-01-16 DIAGNOSIS — G25.81 RESTLESS LEG SYNDROME: ICD-10-CM

## 2022-01-17 RX ORDER — DIPHENOXYLATE HCL/ATROPINE 2.5-.025MG
TABLET ORAL
Qty: 90 TABLET | Refills: 1 | Status: SHIPPED | OUTPATIENT
Start: 2022-01-17 | End: 2022-06-08

## 2022-02-13 DIAGNOSIS — F51.01 PRIMARY INSOMNIA: ICD-10-CM

## 2022-02-14 RX ORDER — ZOLPIDEM TARTRATE 12.5 MG/1
TABLET, FILM COATED, EXTENDED RELEASE ORAL
Qty: 30 TABLET | Refills: 2 | Status: SHIPPED | OUTPATIENT
Start: 2022-02-14 | End: 2022-08-11

## 2022-02-14 NOTE — TELEPHONE ENCOUNTER
Routing refill request to provider for review/approval because:  Drug not on the [facility] refill protocol     Lakeisha Anderson RN on 2/14/2022 at 7:21 AM

## 2022-05-25 DIAGNOSIS — F51.01 PRIMARY INSOMNIA: ICD-10-CM

## 2022-05-26 NOTE — TELEPHONE ENCOUNTER
Routing refill request to provider for review/approval because:  Patient needs to be seen because it has been more than 1 year since last office visit.  Has not seen primary since 11/4/19.    Brit Daniel RN

## 2022-05-27 RX ORDER — TRAZODONE HYDROCHLORIDE 100 MG/1
TABLET ORAL
Qty: 30 TABLET | Refills: 5 | Status: SHIPPED | OUTPATIENT
Start: 2022-05-27 | End: 2023-01-18

## 2022-06-07 DIAGNOSIS — K58.0 IRRITABLE BOWEL SYNDROME WITH DIARRHEA: ICD-10-CM

## 2022-06-07 DIAGNOSIS — G25.81 RESTLESS LEG SYNDROME: ICD-10-CM

## 2022-06-08 RX ORDER — DIPHENOXYLATE HCL/ATROPINE 2.5-.025MG
1-2 TABLET ORAL 3 TIMES DAILY PRN
Qty: 90 TABLET | Refills: 3 | Status: SHIPPED | OUTPATIENT
Start: 2022-06-08 | End: 2022-12-11

## 2022-08-11 ENCOUNTER — VIRTUAL VISIT (OUTPATIENT)
Dept: FAMILY MEDICINE | Facility: CLINIC | Age: 47
End: 2022-08-11
Payer: COMMERCIAL

## 2022-08-11 DIAGNOSIS — F51.01 PRIMARY INSOMNIA: ICD-10-CM

## 2022-08-11 DIAGNOSIS — K58.0 IRRITABLE BOWEL SYNDROME WITH DIARRHEA: ICD-10-CM

## 2022-08-11 DIAGNOSIS — Z76.89 ENCOUNTER TO ESTABLISH CARE: Primary | ICD-10-CM

## 2022-08-11 DIAGNOSIS — Z79.899 MEDICAL CANNABIS USE: ICD-10-CM

## 2022-08-11 PROBLEM — R25.2 SPASTICITY: Status: ACTIVE | Noted: 2019-12-02

## 2022-08-11 PROCEDURE — 2894A VOIDCORRECT: CPT | Mod: 95 | Performed by: FAMILY MEDICINE

## 2022-08-11 NOTE — PROGRESS NOTES
Refugio is a 47 year old who is being evaluated via a billable video visit.      How would you like to obtain your AVS? MyChart  If the video visit is dropped, the invitation should be resent by: Text to cell phone: On VV  Will anyone else be joining your video visit? No          Assessment & Plan     ICD-10-CM    1. Encounter to establish care  Z76.89    2. Insomnia  F51.01    3. Irritable bowel syndrome with diarrhea  K58.0    4. Medical cannabis use  Z79.899      Medical decision making: Patient is making this televisit from [place] where he is going to be for next 3 weeks.  Discussed that treatment and televisit are to be offered over when in [place].  However, we did come up with a treatment plan.  He will do an E-visit if he needs a trazodone refill.  Discussed controlled substance agreement and urine drug screen if he needs to be on Ambien which he has tried in the past and I would not recommend this for long-term.  He uses Lomotil for IBS.  It appears that he had an upper GI endoscopy and a colonoscopy in 2015.  Last note from gastroenterologist from 2016, scanned in media where it was recommended to try Lomotil and then come back in 2 to 3 months.  I do not see any further follow-up.  I have recommended patient follow with GI for long-term plan of care as I do not think Lomotil should be used indefinitely.    I have encouraged patient to schedule for a preventive care visit and to come in person for further evaluation and management.  He verbalizes understanding    On review of his chart and reconciliation of external medication and problem list, there is documentation of medical marijuana from spine clinic.  I will discuss this with him when he comes for in person visit.    No medications prescribed today, no refills done today.     MEDICATIONS:  Continue current medications without change    No follow-ups on file.    Latasha Castanon MD  [facility]    Chief  "Complaint   Patient presents with     Recheck Medication     Sleep meds and IBS meds, they seem to be working fine, but wondering if there are other options to vary and possible better response.      Establish Care       Refugio is a 47 year old, presenting for the following health issues:  Recheck Medication (Sleep meds and IBS meds, they seem to be working fine, but wondering if there are other options to vary and possible better response. ) and Establish Care      History of Present Illness       Reason for visit:  Medication reviewHe consumes 3 sweetened beverage(s) daily.He exercises with enough effort to increase his heart rate 30 to 60 minutes per day.  He exercises with enough effort to increase his heart rate 4 days per week. He is missing 1 dose(s) of medications per week.  He is not taking prescribed medications regularly due to remembering to take.     Patient Active Problem List   Diagnosis     Anxiety state     CARDIOVASCULAR SCREENING; LDL GOAL LESS THAN 160     Insomnia     Restless leg     Irritable bowel syndrome with diarrhea     Medical cannabis use     Spasticity     Current Outpatient Medications   Medication     diphenoxylate-atropine (LOMOTIL) 2.5-0.025 MG tablet     finasteride (PROSCAR) 5 MG tablet     traZODone (DESYREL) 100 MG tablet     No current facility-administered medications for this visit.         Review of Systems   Constitutional, HEENT, cardiovascular, pulmonary, gi and gu systems are negative, except as otherwise noted.      Objective    Vitals - Patient Reported  Weight (Patient Reported): 97.5 kg (215 lb)  Height (Patient Reported): 190.5 cm (6' 3\")  BMI (Based on Pt Reported Ht/Wt): 26.87        Physical Exam   GENERAL: Healthy, alert and no distress  EYES: Eyes grossly normal to inspection.  No discharge or erythema, or obvious scleral/conjunctival abnormalities.  RESP: No audible wheeze, cough, or visible cyanosis.  No visible retractions or increased work of breathing.  "   SKIN: Visible skin clear. No significant rash, abnormal pigmentation or lesions.  NEURO: Cranial nerves grossly intact.  Mentation and speech appropriate for age.  PSYCH: Mentation appears normal, affect normal/bright, judgement and insight intact, normal speech and appearance well-groomed.              Video-Visit Details    Video Start Time: 1:58 PM    Type of service:  Video Visit    Video End Time:2:04 PM    Originating Location (pt. Location): Home    Distant Location (provider location):  [facility]     Platform used for Video Visit: Accera    .  ..  Answers for HPI/ROS submitted by the patient on 8/11/2022  What is the reason for your visit today? : Medication review  On average, how many sweetened beverages do you drink each day (Examples: soda, juice, sweet tea, etc.  Do NOT count diet or artificially sweetened beverages)?: 3  How many minutes a day do you exercise enough to make your heart beat faster?: 30 to 60  How many days a week do you exercise enough to make your heart beat faster?: 4  How many days per week do you miss taking your medication?: 1  What makes it hard for you to take your medication every day?: remembering to take

## 2022-11-19 ENCOUNTER — HEALTH MAINTENANCE LETTER (OUTPATIENT)
Age: 47
End: 2022-11-19

## 2022-12-10 DIAGNOSIS — K58.0 IRRITABLE BOWEL SYNDROME WITH DIARRHEA: ICD-10-CM

## 2022-12-10 DIAGNOSIS — G25.81 RESTLESS LEG SYNDROME: ICD-10-CM

## 2022-12-11 RX ORDER — DIPHENOXYLATE HCL/ATROPINE 2.5-.025MG
TABLET ORAL
Qty: 90 TABLET | Refills: 2 | Status: SHIPPED | OUTPATIENT
Start: 2022-12-11 | End: 2023-08-07

## 2023-01-17 DIAGNOSIS — F51.01 PRIMARY INSOMNIA: ICD-10-CM

## 2023-01-18 RX ORDER — TRAZODONE HYDROCHLORIDE 100 MG/1
TABLET ORAL
Qty: 30 TABLET | Refills: 0 | Status: SHIPPED | OUTPATIENT
Start: 2023-01-18 | End: 2023-01-24

## 2023-01-18 NOTE — TELEPHONE ENCOUNTER
Patient calling requesting refill for trazodone. Patient was seen for [facility] care appointment 08/2022 w/ K.B. Patient has not seen Dr. Hinojosa since 2019. Please review and advise on refill request.     Patient also requesting message be sent to Dr. Hinojosa.     Angel Luis KENT RN 1/18/2023 at 8:45 AM

## 2023-01-24 ENCOUNTER — VIRTUAL VISIT (OUTPATIENT)
Dept: PEDIATRICS | Facility: CLINIC | Age: 48
End: 2023-01-24
Payer: COMMERCIAL

## 2023-01-24 DIAGNOSIS — F41.1 ANXIETY STATE: Primary | Chronic | ICD-10-CM

## 2023-01-24 DIAGNOSIS — F51.01 PRIMARY INSOMNIA: ICD-10-CM

## 2023-01-24 PROCEDURE — 99214 OFFICE O/P EST MOD 30 MIN: CPT | Mod: GT | Performed by: INTERNAL MEDICINE

## 2023-01-24 RX ORDER — ESCITALOPRAM OXALATE 10 MG/1
10 TABLET ORAL DAILY
Qty: 90 TABLET | Refills: 3 | Status: SHIPPED | OUTPATIENT
Start: 2023-01-24 | End: 2023-08-11

## 2023-01-24 RX ORDER — LORAZEPAM 0.5 MG/1
0.5 TABLET ORAL EVERY 6 HOURS PRN
Qty: 30 TABLET | Refills: 0 | Status: SHIPPED | OUTPATIENT
Start: 2023-01-24 | End: 2023-06-12

## 2023-01-24 RX ORDER — TRAZODONE HYDROCHLORIDE 100 MG/1
100 TABLET ORAL
Qty: 30 TABLET | Refills: 5 | Status: SHIPPED | OUTPATIENT
Start: 2023-01-24 | End: 2023-09-14

## 2023-01-24 RX ORDER — ZOLPIDEM TARTRATE 12.5 MG/1
TABLET, FILM COATED, EXTENDED RELEASE ORAL
Qty: 30 TABLET | Refills: 5 | Status: SHIPPED | OUTPATIENT
Start: 2023-01-24 | End: 2023-08-11

## 2023-01-24 NOTE — PATIENT INSTRUCTIONS
Restart escitalopram 10 mg once per day    OK to alternate zolpidem and trazodone as needed for sleep    Lorazepam as needed for significant anxiety flares

## 2023-01-24 NOTE — PROGRESS NOTES
Refugio is a 47 year old who is being evaluated via a billable video visit.      Assessment & Plan       ICD-10-CM    1. Anxiety state  F41.1 escitalopram (LEXAPRO) 10 MG tablet     LORazepam (ATIVAN) 0.5 MG tablet      2. Insomnia  F51.01 traZODone (DESYREL) 100 MG tablet     zolpidem ER (AMBIEN CR) 12.5 MG CR tablet        Restart escitalopram 10 mg once per day    OK to alternate zolpidem and trazodone as needed for sleep    Lorazepam as needed for significant anxiety flares    Tay Hinojosa MD  [facility] XOCHITLAIDA Huff is a 47 year old, presenting for the following health issues:    History of Present Illness       Reason for visit:  Sleep--discuss medications    He eats 0-1 servings of fruits and vegetables daily.He consumes 2 sweetened beverage(s) daily.He exercises with enough effort to increase his heart rate 30 to 60 minutes per day.  He exercises with enough effort to increase his heart rate 4 days per week. He is missing 1 dose(s) of medications per week.  He is not taking prescribed medications regularly due to other.     Insomnia.  Chronic issue.  Started cannabis use, has been helping significantly w/ sleep.  He would like to stop cannabis use.  In the past he has taken trazodone alternating with zolpidem for sleep.   He would like to go back to that regimen.    Anxiety. Continues to have symptoms.  He had taken Lexapro in the past. Stopped just over 1 year ago.  His anxiety is increasing, would like to restart Lexapro.  Also had rx for lorazepam in the past. Would like a one-time rx for 30 tablets.             Objective           Vitals:  No vitals were obtained today due to virtual visit.    Physical Exam   GEN: No distress  LUNGS: No active cough, wheezing.   PSYCH: Normal affect. Well groomed.           Video-Visit Details    Type of service:  Video Visit     Originating Location (pt. Location): Home  Distant Location (provider location):  Off-site  Platform used for Video  Visit: Srinivasa

## 2023-04-09 ENCOUNTER — HEALTH MAINTENANCE LETTER (OUTPATIENT)
Age: 48
End: 2023-04-09

## 2023-08-07 DIAGNOSIS — G25.81 RESTLESS LEG SYNDROME: ICD-10-CM

## 2023-08-07 DIAGNOSIS — K58.0 IRRITABLE BOWEL SYNDROME WITH DIARRHEA: ICD-10-CM

## 2023-08-07 RX ORDER — DIPHENOXYLATE HCL/ATROPINE 2.5-.025MG
TABLET ORAL
Qty: 90 TABLET | Refills: 2 | Status: SHIPPED | OUTPATIENT
Start: 2023-08-07 | End: 2024-03-25

## 2023-08-07 NOTE — TELEPHONE ENCOUNTER
Routing refill request to provider for review/approval because:  Drug not on the [facility] refill protocol     Angel Luis KENT RN 8/7/2023 at 3:07 PM

## 2023-08-09 ENCOUNTER — E-VISIT (OUTPATIENT)
Dept: PEDIATRICS | Facility: CLINIC | Age: 48
End: 2023-08-09
Payer: COMMERCIAL

## 2023-08-09 DIAGNOSIS — F41.1 ANXIETY STATE: Chronic | ICD-10-CM

## 2023-08-09 DIAGNOSIS — F51.01 PRIMARY INSOMNIA: ICD-10-CM

## 2023-08-09 PROCEDURE — 99421 OL DIG E/M SVC 5-10 MIN: CPT | Mod: 93 | Performed by: INTERNAL MEDICINE

## 2023-08-09 ASSESSMENT — ANXIETY QUESTIONNAIRES
1. FEELING NERVOUS, ANXIOUS, OR ON EDGE: NEARLY EVERY DAY
6. BECOMING EASILY ANNOYED OR IRRITABLE: SEVERAL DAYS
5. BEING SO RESTLESS THAT IT IS HARD TO SIT STILL: NEARLY EVERY DAY
4. TROUBLE RELAXING: NEARLY EVERY DAY
3. WORRYING TOO MUCH ABOUT DIFFERENT THINGS: NEARLY EVERY DAY
2. NOT BEING ABLE TO STOP OR CONTROL WORRYING: MORE THAN HALF THE DAYS
7. FEELING AFRAID AS IF SOMETHING AWFUL MIGHT HAPPEN: SEVERAL DAYS
GAD7 TOTAL SCORE: 16
GAD7 TOTAL SCORE: 16

## 2023-08-09 ASSESSMENT — PATIENT HEALTH QUESTIONNAIRE - PHQ9
10. IF YOU CHECKED OFF ANY PROBLEMS, HOW DIFFICULT HAVE THESE PROBLEMS MADE IT FOR YOU TO DO YOUR WORK, TAKE CARE OF THINGS AT HOME, OR GET ALONG WITH OTHER PEOPLE: VERY DIFFICULT
SUM OF ALL RESPONSES TO PHQ QUESTIONS 1-9: 9
SUM OF ALL RESPONSES TO PHQ QUESTIONS 1-9: 9

## 2023-08-10 ASSESSMENT — ANXIETY QUESTIONNAIRES: GAD7 TOTAL SCORE: 16

## 2023-08-10 ASSESSMENT — PATIENT HEALTH QUESTIONNAIRE - PHQ9: SUM OF ALL RESPONSES TO PHQ QUESTIONS 1-9: 9

## 2023-08-11 RX ORDER — ESCITALOPRAM OXALATE 10 MG/1
10 TABLET ORAL DAILY
Qty: 90 TABLET | Refills: 3 | Status: SHIPPED | OUTPATIENT
Start: 2023-08-11 | End: 2023-09-14

## 2023-08-11 RX ORDER — ZOLPIDEM TARTRATE 12.5 MG/1
TABLET, FILM COATED, EXTENDED RELEASE ORAL
Qty: 30 TABLET | Refills: 5 | Status: SHIPPED | OUTPATIENT
Start: 2023-08-11 | End: 2024-02-17

## 2023-08-11 RX ORDER — LORAZEPAM 0.5 MG/1
0.5 TABLET ORAL EVERY 6 HOURS PRN
Qty: 30 TABLET | Refills: 0 | Status: SHIPPED | OUTPATIENT
Start: 2023-08-11 | End: 2023-09-11

## 2023-09-10 DIAGNOSIS — F41.1 ANXIETY STATE: Chronic | ICD-10-CM

## 2023-09-11 RX ORDER — LORAZEPAM 0.5 MG/1
0.5 TABLET ORAL EVERY 6 HOURS PRN
Qty: 30 TABLET | Refills: 0 | Status: SHIPPED | OUTPATIENT
Start: 2023-09-11 | End: 2023-10-18

## 2023-09-14 ENCOUNTER — E-VISIT (OUTPATIENT)
Dept: PEDIATRICS | Facility: CLINIC | Age: 48
End: 2023-09-14
Payer: COMMERCIAL

## 2023-09-14 DIAGNOSIS — F41.1 ANXIETY STATE: Chronic | ICD-10-CM

## 2023-09-14 DIAGNOSIS — F51.01 PRIMARY INSOMNIA: ICD-10-CM

## 2023-09-14 PROCEDURE — 99421 OL DIG E/M SVC 5-10 MIN: CPT | Mod: 93 | Performed by: INTERNAL MEDICINE

## 2023-09-14 RX ORDER — TRAZODONE HYDROCHLORIDE 100 MG/1
100 TABLET ORAL
Qty: 30 TABLET | Refills: 5 | Status: SHIPPED | OUTPATIENT
Start: 2023-09-14 | End: 2024-05-26

## 2023-09-14 RX ORDER — ESCITALOPRAM OXALATE 20 MG/1
20 TABLET ORAL DAILY
Qty: 90 TABLET | Refills: 3 | Status: SHIPPED | OUTPATIENT
Start: 2023-09-14 | End: 2024-08-28

## 2023-10-18 DIAGNOSIS — F41.1 ANXIETY STATE: Chronic | ICD-10-CM

## 2023-10-18 RX ORDER — LORAZEPAM 0.5 MG/1
0.5 TABLET ORAL EVERY 6 HOURS PRN
Qty: 30 TABLET | Refills: 1 | Status: SHIPPED | OUTPATIENT
Start: 2023-10-18 | End: 2023-12-18

## 2023-12-18 DIAGNOSIS — F41.1 ANXIETY STATE: Chronic | ICD-10-CM

## 2023-12-18 RX ORDER — LORAZEPAM 0.5 MG/1
0.5 TABLET ORAL EVERY 6 HOURS PRN
Qty: 30 TABLET | Refills: 1 | Status: SHIPPED | OUTPATIENT
Start: 2023-12-18 | End: 2024-02-19

## 2024-02-17 DIAGNOSIS — F41.1 ANXIETY STATE: Chronic | ICD-10-CM

## 2024-02-17 DIAGNOSIS — F51.01 PRIMARY INSOMNIA: ICD-10-CM

## 2024-02-19 RX ORDER — LORAZEPAM 0.5 MG/1
0.5 TABLET ORAL EVERY 6 HOURS PRN
Qty: 30 TABLET | Refills: 1 | Status: SHIPPED | OUTPATIENT
Start: 2024-02-19 | End: 2024-05-14

## 2024-02-19 RX ORDER — ZOLPIDEM TARTRATE 12.5 MG/1
TABLET, FILM COATED, EXTENDED RELEASE ORAL
Qty: 30 TABLET | Refills: 5 | Status: SHIPPED | OUTPATIENT
Start: 2024-02-19 | End: 2024-08-21

## 2024-03-24 DIAGNOSIS — K58.0 IRRITABLE BOWEL SYNDROME WITH DIARRHEA: ICD-10-CM

## 2024-03-24 DIAGNOSIS — G25.81 RESTLESS LEG SYNDROME: ICD-10-CM

## 2024-03-25 RX ORDER — DIPHENOXYLATE HCL/ATROPINE 2.5-.025MG
TABLET ORAL
Qty: 90 TABLET | Refills: 1 | Status: SHIPPED | OUTPATIENT
Start: 2024-03-25 | End: 2024-06-25

## 2024-05-13 DIAGNOSIS — F41.1 ANXIETY STATE: Chronic | ICD-10-CM

## 2024-05-14 RX ORDER — LORAZEPAM 0.5 MG/1
0.5 TABLET ORAL EVERY 6 HOURS PRN
Qty: 30 TABLET | Refills: 1 | Status: SHIPPED | OUTPATIENT
Start: 2024-05-14 | End: 2024-08-21

## 2024-05-26 ENCOUNTER — MYC REFILL (OUTPATIENT)
Dept: PEDIATRICS | Facility: CLINIC | Age: 49
End: 2024-05-26
Payer: COMMERCIAL

## 2024-05-26 DIAGNOSIS — F51.01 PRIMARY INSOMNIA: ICD-10-CM

## 2024-05-28 RX ORDER — TRAZODONE HYDROCHLORIDE 100 MG/1
100 TABLET ORAL
Qty: 30 TABLET | Refills: 5 | Status: SHIPPED | OUTPATIENT
Start: 2024-05-28

## 2024-06-16 ENCOUNTER — HEALTH MAINTENANCE LETTER (OUTPATIENT)
Age: 49
End: 2024-06-16

## 2024-06-25 DIAGNOSIS — K58.0 IRRITABLE BOWEL SYNDROME WITH DIARRHEA: ICD-10-CM

## 2024-06-25 DIAGNOSIS — G25.81 RESTLESS LEG SYNDROME: ICD-10-CM

## 2024-06-25 RX ORDER — DIPHENOXYLATE HCL/ATROPINE 2.5-.025MG
1-2 TABLET ORAL 3 TIMES DAILY PRN
Qty: 90 TABLET | Refills: 1 | Status: SHIPPED | OUTPATIENT
Start: 2024-06-25

## 2024-08-15 ENCOUNTER — TELEPHONE (OUTPATIENT)
Dept: PEDIATRICS | Facility: CLINIC | Age: 49
End: 2024-08-15
Payer: COMMERCIAL

## 2024-08-15 NOTE — TELEPHONE ENCOUNTER
Patient calls to schedule a physical before the end of the month as he is losing insurance. Scheduled appointment:  Future Appointments 8/15/2024 - 2/11/2025        Date Visit Type Length Department Provider     8/28/2024  3:00 PM PREVENTATIVE ADULT 60 min EA IM/Fatuma Snowden MD    Location Instructions:     [facility] is located at [address], just west of the [facility] [facility] exit off of [address]. Free parking is available; from either [address] or [address], access the lot by turning onto [address].                    Latoya Perry RN on 8/15/2024 at 1:54 PM

## 2024-08-21 DIAGNOSIS — F41.1 ANXIETY STATE: Chronic | ICD-10-CM

## 2024-08-21 DIAGNOSIS — F51.01 PRIMARY INSOMNIA: ICD-10-CM

## 2024-08-21 RX ORDER — LORAZEPAM 0.5 MG/1
0.5 TABLET ORAL EVERY 6 HOURS PRN
Qty: 30 TABLET | Refills: 1 | Status: SHIPPED | OUTPATIENT
Start: 2024-08-21

## 2024-08-22 RX ORDER — ZOLPIDEM TARTRATE 12.5 MG/1
TABLET, FILM COATED, EXTENDED RELEASE ORAL
Qty: 30 TABLET | Refills: 5 | Status: SHIPPED | OUTPATIENT
Start: 2024-08-22

## 2024-08-28 ENCOUNTER — OFFICE VISIT (OUTPATIENT)
Dept: PEDIATRICS | Facility: CLINIC | Age: 49
End: 2024-08-28
Payer: COMMERCIAL

## 2024-08-28 VITALS
HEART RATE: 58 BPM | WEIGHT: 222.4 LBS | HEIGHT: 75 IN | OXYGEN SATURATION: 98 % | RESPIRATION RATE: 18 BRPM | TEMPERATURE: 98.1 F | DIASTOLIC BLOOD PRESSURE: 85 MMHG | SYSTOLIC BLOOD PRESSURE: 135 MMHG | BODY MASS INDEX: 27.65 KG/M2

## 2024-08-28 DIAGNOSIS — Z11.59 NEED FOR HEPATITIS C SCREENING TEST: ICD-10-CM

## 2024-08-28 DIAGNOSIS — F51.01 PRIMARY INSOMNIA: ICD-10-CM

## 2024-08-28 DIAGNOSIS — K58.0 IRRITABLE BOWEL SYNDROME WITH DIARRHEA: ICD-10-CM

## 2024-08-28 DIAGNOSIS — Z00.00 ROUTINE GENERAL MEDICAL EXAMINATION AT A HEALTH CARE FACILITY: Primary | ICD-10-CM

## 2024-08-28 DIAGNOSIS — Z11.4 SCREENING FOR HIV (HUMAN IMMUNODEFICIENCY VIRUS): ICD-10-CM

## 2024-08-28 DIAGNOSIS — F41.1 ANXIETY STATE: Chronic | ICD-10-CM

## 2024-08-28 LAB
ERYTHROCYTE [DISTWIDTH] IN BLOOD BY AUTOMATED COUNT: 12.5 % (ref 10–15)
HCT VFR BLD AUTO: 44.4 % (ref 40–53)
HGB BLD-MCNC: 15.2 G/DL (ref 13.3–17.7)
MCH RBC QN AUTO: 29.7 PG (ref 26.5–33)
MCHC RBC AUTO-ENTMCNC: 34.2 G/DL (ref 31.5–36.5)
MCV RBC AUTO: 87 FL (ref 78–100)
PLATELET # BLD AUTO: 210 10E3/UL (ref 150–450)
RBC # BLD AUTO: 5.12 10E6/UL (ref 4.4–5.9)
WBC # BLD AUTO: 6.9 10E3/UL (ref 4–11)

## 2024-08-28 PROCEDURE — 99214 OFFICE O/P EST MOD 30 MIN: CPT | Mod: 25

## 2024-08-28 PROCEDURE — 99396 PREV VISIT EST AGE 40-64: CPT | Mod: GC

## 2024-08-28 PROCEDURE — 90471 IMMUNIZATION ADMIN: CPT

## 2024-08-28 PROCEDURE — 80048 BASIC METABOLIC PNL TOTAL CA: CPT

## 2024-08-28 PROCEDURE — 85027 COMPLETE CBC AUTOMATED: CPT

## 2024-08-28 PROCEDURE — 86803 HEPATITIS C AB TEST: CPT

## 2024-08-28 PROCEDURE — 87389 HIV-1 AG W/HIV-1&-2 AB AG IA: CPT

## 2024-08-28 PROCEDURE — 36415 COLL VENOUS BLD VENIPUNCTURE: CPT

## 2024-08-28 PROCEDURE — 90746 HEPB VACCINE 3 DOSE ADULT IM: CPT

## 2024-08-28 PROCEDURE — 80061 LIPID PANEL: CPT

## 2024-08-28 RX ORDER — DICYCLOMINE HYDROCHLORIDE 10 MG/1
20 CAPSULE ORAL
Qty: 240 CAPSULE | Refills: 0 | Status: SHIPPED | OUTPATIENT
Start: 2024-08-28 | End: 2024-09-27

## 2024-08-28 ASSESSMENT — PAIN SCALES - GENERAL: PAINLEVEL: NO PAIN (0)

## 2024-08-28 NOTE — PROGRESS NOTES
{PROVIDER CHARTING PREFERENCE:786810}    Skye Huff is a 49 year old, presenting for the following health issues:  Physical  {(!) Visit Details have not yet been documented.  Please enter Visit Details and then use this list to pull in documentation. (Optional):616896}  HPI     {MA/LPN/RN Pre-Provider Visit Orders- hCG/UA/Strep (Optional):710480}  {SUPERLIST (Optional):720377}  {additonal problems for provider to add (Optional):090976}    {ROS Picklists (Optional):773178}      Objective    There were no vitals taken for this visit.  There is no height or weight on file to calculate BMI.  Physical Exam   {Exam List (Optional):297413}    {Diagnostic Test Results (Optional):558344}        Signed Electronically by: Fatuma Nevarez MD  {Email feedback regarding this note to [email or web address]   :735703}

## 2024-08-28 NOTE — PROGRESS NOTES
Preventive Care Visit  [facility] XOCHITL Nevarez MD, Internal Medicine - Pediatrics  Aug 28, 2024      Assessment & Plan     Irritable bowel syndrome with diarrhea  Anxiety state  Patient has daily diarrhea despite significant diet changes. Previously had some relief of symptoms with lomotil, but this has become less effective over time. Recommending bentyl as a new anti-motility agent, and psyllium for stool-bulking. Given that pt notes improvement of diarrheal symptoms when taking lorazepam, suspect that there is an anxiety component contributing to his IBS symptoms. He has tried escitalopram in the past without symptom improvement, but in the future could consider trying another selective serotonin reuptake inhibitor or mirtazapine which would have added sleep benefit as discussed below.   - dicyclomine (BENTYL) 10 MG capsule; Take 2 capsules (20 mg) by mouth 4 times daily (before meals and nightly).  - psyllium (METAMUCIL/KONSYL) capsule; Take 1 capsule by mouth daily.  - Decrease caffeine consumption    Insomnia  Counseled pt on sleep hygiene. Pt uses marijuana as a sleep aid, we discussed the risks of vaping and recommended a different form of marijuana ingestion such as gummy or edible. He alternates zolpidem and trazodone but takes only as needed. Discussed trialing mirtazapine to help with sleep, anxiety, and potentially have some anti-diarrheal effects as well. Should revisit this option at his follow up appointment in 1-2 months.     Routine general medical examination at a health care facility  - Lipid panel reflex to direct LDL Non-fasting; today  - CBC with platelets; today  - Basic metabolic panel  (Ca, Cl, CO2, Creat, Gluc, K, Na, BUN); today  - Due for Hepatitis B vaccine, administered today  - Colonoscopy in 2015; will be due next year    Screening for HIV (human immunodeficiency virus)  - HIV Antigen Antibody Combo; today    Need for hepatitis C screening test  - Hepatitis C  "Screen Reflex to HCV RNA Quant and Genotype; today         BMI  Estimated body mass index is 28.15 kg/m  as calculated from the following:    Height as of this encounter: 1.893 m (6' 2.53\").    Weight as of this encounter: 100.9 kg (222 lb 6.4 oz).   Weight management plan: Discussed healthy diet and exercise guidelines    Counseling  Appropriate preventive services were addressed with this patient via screening, questionnaire, or discussion as appropriate for fall prevention, nutrition, physical activity, Tobacco-use cessation, social engagement, weight loss and cognition. Reviewed patient's diet, addressing concerns and/or questions.       Skye Huff is a 49 year old, presenting for the following:  Physical        8/28/2024     2:50 PM   Additional Questions   Roomed by Yazmin Carrilol   Accompanied by N/a      Pt is losing insurance soon and just refilled meds last week, wondering if he can get more before insurance is gone     Health Care Directive  Patient does not have a Health Care Directive or Living Will:     DANIA Huff is here for a physical and routine visit as he has not been to the doctor in some time. He reports ongoing chronic issues of IBS with diarrhea, anxiety surrounding his IBS, as well as insomnia.     He has been dealing with IBS symptoms for 10-15 years. Has been vegetarian for 2 years, currently on the BRAT diet which does help with symptoms, however he does have daily loose stools despite diet changes. He drinks 1-2 monster energy drinks a day, but has been trying to wean down on them.     He reports significant anxiety related to his IBS as he is constantly thinking about where the closest bathroom his. He coaches middle school sports teams, and is anxious during long games, bus rides, etc. He takes lorazepam during this periods where he expects to be away from a bathroom for a few hours, reports that lorazepam helps keep his mind off of the anxiety and also seems to slow his bowels down. " "He reports that if it were not for his IBS, he would have no anxiety at all. Previously tried escitalopram but did not find it beneficial.     Had colonscopy and endoscpy a few years ago which were negative. Has not followed up with GI since then. Takes \"ion\" supplement and lomotil to help with diarrheal symptoms. Lomotil used to help, but has been less effective recently.     He has had difficulty with insomnia for many years. Has issues falling asleep and staying asleep. Mariajuana has been the most helpful thing for his sleep. He vapes marijuana every night before bed. If he still has difficulty falling asleep a few hours after vaping marijuana, he takes either zolpidem or trazadone (alternates them). He never takes both on the same night. Has been needing these medications most nights recently.       For exercise he lifts 3 times a week, does cardio 5 times a week. He reports a good support sytem of family and friends.   Drinks alcohol occasionally, never more than 1 drink. No tobacco use. Does vape marijuana nightly. Not sexually active.          8/28/2024   General Health   How would you rate your overall physical health? Good   Feel stress (tense, anxious, or unable to sleep) To some extent      (!) STRESS CONCERN      8/28/2024   Nutrition   Three or more servings of calcium each day? (!) I DON'T KNOW   Diet: Vegetarian/vegan   How many servings of fruit and vegetables per day? (!) 0-1   How many sweetened beverages each day? (!) 2            8/28/2024   Exercise   Days per week of moderate/strenous exercise 5 days   Average minutes spent exercising at this level 60 min            8/28/2024   Social Factors   Frequency of gathering with friends or relatives More than three times a week   Worry food won't last until get money to buy more No   Food not last or not have enough money for food? No   Do you have housing? (Housing is defined as stable permanent housing and does not include staying ouside in a car, " "in a tent, in an abandoned building, in an overnight shelter, or couch-surfing.) Yes   Are you worried about losing your housing? No   Lack of transportation? No   Unable to get utilities (heat,electricity)? No            8/28/2024   Dental   Dentist two times every year? (!) NO            8/28/2024   TB Screening   Were you born outside of the [place]? No            Today's PHQ-2 Score:       8/28/2024     2:45 PM   PHQ-2 ( 1999 [employer])   Q1: Little interest or pleasure in doing things 0   Q2: Feeling down, depressed or hopeless 0   PHQ-2 Score 0   Q1: Little interest or pleasure in doing things Not at all   Q2: Feeling down, depressed or hopeless Not at all   PHQ-2 Score 0           8/28/2024   Substance Use   Alcohol more than 3/day or more than 7/wk No   Do you use any other substances recreationally? (!) CANNABIS PRODUCTS        Social History     Tobacco Use    Smoking status: Never    Smokeless tobacco: Never   Vaping Use    Vaping status: Never Used   Substance Use Topics    Alcohol use: Yes     Alcohol/week: 0.0 standard drinks of alcohol     Comment: on the weekends    Drug use: Yes     Types: Marijuana     Comment: Medical Marijuana for sleep at night             8/28/2024   One time HIV Screening   Previous HIV test? I don't know          8/28/2024   STI Screening   New sexual partner(s) since last STI/HIV test? No      ASCVD Risk   The ASCVD Risk score (Darlene JUSTICE, et al., 2019) failed to calculate for the following reasons:    Cannot find a previous HDL lab    Cannot find a previous total cholesterol lab        8/28/2024   Contraception/Family Planning   Questions about contraception or family planning No           Reviewed and updated as needed this visit by Provider                     Objective    Exam  /85 (BP Location: Right arm, Patient Position: Sitting, Cuff Size: Adult Large)   Pulse 58   Temp 98.1  F (36.7  C) (Tympanic)   Resp 18   Ht 1.893 m (6' 2.53\")   Wt 100.9 kg (222 lb " "6.4 oz)   SpO2 98%   BMI 28.15 kg/m     Estimated body mass index is 28.15 kg/m  as calculated from the following:    Height as of this encounter: 1.893 m (6' 2.53\").    Weight as of this encounter: 100.9 kg (222 lb 6.4 oz).    Physical Exam  GENERAL: alert and no distress  EYES: Eyes grossly normal to inspection, PERRL and conjunctivae and sclerae normal  HENT: ear canals and TM's normal, nose and mouth without ulcers or lesions  NECK: no adenopathy, no asymmetry, masses, or scars  RESP: lungs clear to auscultation - no rales, rhonchi or wheezes  CV: regular rate and rhythm, normal S1 S2, no S3 or S4, no murmur, click or rub, no peripheral edema  ABDOMEN: soft, nontender, no hepatosplenomegaly, no masses and bowel sounds normal  MS: no gross musculoskeletal defects noted, no edema  SKIN: no suspicious lesions or rashes  NEURO: Normal strength and tone, mentation intact and speech normal  PSYCH: mentation appears normal, affect normal/bright  LYMPH: no cervical, supraclavicular, adenopathy      Signed Electronically by: Fatuma Nevarez MD    "

## 2024-08-28 NOTE — PATIENT INSTRUCTIONS
Today you were seen in clinic for a maintenance health visit and physical exam.  You received the hepatitis B vaccine and had basic blood tests done. Results will be available in [employer] within the next week.  We recommend consuming less caffeine, and transitioning to another form of marijuana other than vaping.   We are prescribing two new medications to help with your diarrhea: bentyl and psillium  Please return in clinic in 1-2 months to follow up and discuss further options for IBS and anxiety management.

## 2024-08-29 LAB
ANION GAP SERPL CALCULATED.3IONS-SCNC: 10 MMOL/L (ref 7–15)
BUN SERPL-MCNC: 5.7 MG/DL (ref 6–20)
CALCIUM SERPL-MCNC: 9.6 MG/DL (ref 8.8–10.4)
CHLORIDE SERPL-SCNC: 103 MMOL/L (ref 98–107)
CHOLEST SERPL-MCNC: 221 MG/DL
CREAT SERPL-MCNC: 1.05 MG/DL (ref 0.67–1.17)
EGFRCR SERPLBLD CKD-EPI 2021: 88 ML/MIN/1.73M2
FASTING STATUS PATIENT QL REPORTED: NO
FASTING STATUS PATIENT QL REPORTED: NO
GLUCOSE SERPL-MCNC: 93 MG/DL (ref 70–99)
HCO3 SERPL-SCNC: 27 MMOL/L (ref 22–29)
HCV AB SERPL QL IA: NONREACTIVE
HDLC SERPL-MCNC: 50 MG/DL
HIV 1+2 AB+HIV1 P24 AG SERPL QL IA: NONREACTIVE
LDLC SERPL CALC-MCNC: 136 MG/DL
NONHDLC SERPL-MCNC: 171 MG/DL
POTASSIUM SERPL-SCNC: 4.5 MMOL/L (ref 3.4–5.3)
SODIUM SERPL-SCNC: 140 MMOL/L (ref 135–145)
TRIGL SERPL-MCNC: 173 MG/DL

## 2024-11-05 DIAGNOSIS — K58.0 IRRITABLE BOWEL SYNDROME WITH DIARRHEA: ICD-10-CM

## 2024-11-13 RX ORDER — DICYCLOMINE HYDROCHLORIDE 10 MG/1
20 CAPSULE ORAL
Qty: 240 CAPSULE | Refills: 1 | Status: SHIPPED | OUTPATIENT
Start: 2024-11-13

## 2025-01-07 DIAGNOSIS — K58.0 IRRITABLE BOWEL SYNDROME WITH DIARRHEA: ICD-10-CM

## 2025-01-07 DIAGNOSIS — G25.81 RESTLESS LEG SYNDROME: ICD-10-CM

## 2025-01-07 DIAGNOSIS — F41.1 ANXIETY STATE: Chronic | ICD-10-CM

## 2025-01-07 RX ORDER — DIPHENOXYLATE HYDROCHLORIDE AND ATROPINE SULFATE 2.5; .025 MG/1; MG/1
1-2 TABLET ORAL 3 TIMES DAILY PRN
Qty: 90 TABLET | Refills: 1 | Status: SHIPPED | OUTPATIENT
Start: 2025-01-07

## 2025-01-07 RX ORDER — LORAZEPAM 0.5 MG/1
0.5 TABLET ORAL EVERY 6 HOURS PRN
Qty: 30 TABLET | Refills: 1 | Status: SHIPPED | OUTPATIENT
Start: 2025-01-07

## 2025-03-03 DIAGNOSIS — F51.01 PRIMARY INSOMNIA: ICD-10-CM

## 2025-03-05 DIAGNOSIS — F51.01 PRIMARY INSOMNIA: ICD-10-CM

## 2025-03-05 RX ORDER — TRAZODONE HYDROCHLORIDE 100 MG/1
TABLET ORAL
Qty: 30 TABLET | Refills: 3 | Status: SHIPPED | OUTPATIENT
Start: 2025-03-05

## 2025-03-05 RX ORDER — ZOLPIDEM TARTRATE 12.5 MG/1
TABLET, FILM COATED, EXTENDED RELEASE ORAL
Qty: 30 TABLET | Refills: 0 | Status: SHIPPED | OUTPATIENT
Start: 2025-03-05

## 2025-03-13 ENCOUNTER — TELEPHONE (OUTPATIENT)
Dept: PEDIATRICS | Facility: CLINIC | Age: 50
End: 2025-03-13

## 2025-03-13 ENCOUNTER — E-VISIT (OUTPATIENT)
Dept: PEDIATRICS | Facility: CLINIC | Age: 50
End: 2025-03-13

## 2025-03-13 DIAGNOSIS — F41.1 ANXIETY STATE: Chronic | ICD-10-CM

## 2025-03-13 RX ORDER — LORAZEPAM 1 MG/1
.5-1 TABLET ORAL EVERY 6 HOURS PRN
Qty: 30 TABLET | Refills: 1 | Status: SHIPPED | OUTPATIENT
Start: 2025-03-13

## 2025-03-13 NOTE — TELEPHONE ENCOUNTER
"Received a call from the patient   - Patient states that he is between jobs at the time and reports that he does not have insurance at this time   - Patient states that he is willing to submit an eVisit, but he reports that it is not allowing him to submit the eVisit as it states \"no available provider\"   - Patient states that Dr. Hinojosa is his PCP   - Patient requesting an increase in his lorazepam dose (from 0.5 mg to 1 mg) for about 2 months as he reports that he has been experiencing some increased anxiety and has discontinued his medical marijuana   - Assisted the patient in submitting an eVisit   - Upon chart review, the eVisit was submitted     LORazepam (ATIVAN) 0.5 MG tablet 30 tablet 1 1/7/2025 -- No   Sig - Route: TAKE 1 TABLET(0.5 MG) BY MOUTH EVERY 6 HOURS AS NEEDED FOR ANXIETY - Oral     Maribel ROMAN RN   [facility]     "

## 2025-03-13 NOTE — TELEPHONE ENCOUNTER
Upon chart review:   - Dr. Hinojosa replied to the patient's eVisit     Called the patient at [phone]   - Informed the patient that Dr. Hinojosa sent in a prescription for LORazepam (ATIVAN) 1 MG tablet to the [id number] - [address] [facility] [address]   - Informed the patient of Dr. Hinojosa's comments/recommendations; informed the patietn that the tablets can be split in half, so if he is not having severe anxiety he could still take 0.5 mg per dose   - Patient verbalized understanding and agrees with the plan     LORazepam (ATIVAN) 1 MG tablet 30 tablet 1 3/13/2025 -- No   Sig - Route: Take 0.5-1 tablets (0.5-1 mg) by mouth every 6 hours as needed for anxiety. - Oral     Maribel ROMAN RN   [facility]

## 2025-04-14 DIAGNOSIS — G25.81 RESTLESS LEG SYNDROME: ICD-10-CM

## 2025-04-14 DIAGNOSIS — K58.0 IRRITABLE BOWEL SYNDROME WITH DIARRHEA: ICD-10-CM

## 2025-04-14 DIAGNOSIS — F51.01 PRIMARY INSOMNIA: ICD-10-CM

## 2025-04-14 RX ORDER — DIPHENOXYLATE HYDROCHLORIDE AND ATROPINE SULFATE 2.5; .025 MG/1; MG/1
TABLET ORAL
Qty: 90 TABLET | Refills: 2 | Status: SHIPPED | OUTPATIENT
Start: 2025-04-14

## 2025-04-14 RX ORDER — ZOLPIDEM TARTRATE 12.5 MG/1
TABLET, FILM COATED, EXTENDED RELEASE ORAL
Qty: 30 TABLET | Refills: 5 | Status: SHIPPED | OUTPATIENT
Start: 2025-04-14

## 2025-05-19 ENCOUNTER — MYC REFILL (OUTPATIENT)
Dept: PEDIATRICS | Facility: CLINIC | Age: 50
End: 2025-05-19
Payer: COMMERCIAL

## 2025-05-19 DIAGNOSIS — F41.1 ANXIETY STATE: Chronic | ICD-10-CM

## 2025-05-20 RX ORDER — LORAZEPAM 1 MG/1
.5-1 TABLET ORAL EVERY 6 HOURS PRN
Qty: 30 TABLET | Refills: 0 | Status: SHIPPED | OUTPATIENT
Start: 2025-05-20

## 2025-05-20 NOTE — TELEPHONE ENCOUNTER
Routing to preceptor - I believe this is a duplicate so please review.    May Schwartz MD  Internal Medicine-Pediatrics

## 2025-05-20 NOTE — CONFIDENTIAL NOTE
He is due for a visit. I cannot continue to prescribe this medication until he has a visit.  One refill sent. Please schedule office visit.   Kimberly Moe MD   Internal Medicine - Pediatrics

## 2025-05-22 NOTE — TELEPHONE ENCOUNTER
1st attempt: mychart sent   to schedule.  No add'l refills until scheduled.         HENNA Blair MA

## 2025-07-15 DIAGNOSIS — F41.1 ANXIETY STATE: Chronic | ICD-10-CM

## 2025-07-15 RX ORDER — LORAZEPAM 1 MG/1
.5-1 TABLET ORAL EVERY 6 HOURS PRN
Qty: 30 TABLET | Refills: 0 | Status: SHIPPED | OUTPATIENT
Start: 2025-07-15

## 2025-07-15 NOTE — TELEPHONE ENCOUNTER
Received call from patient requesting refill of ativan.   Per chart review, patient needs an appointment prior to refill.   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   Patient is willing to pay out of pocket for medication.  Annual wellness scheduled for 9/5/25    Medication pended, please review and sign if appropriate.    Keysha Jensen RN   [facility]

## 2025-07-16 ENCOUNTER — MYC REFILL (OUTPATIENT)
Dept: PEDIATRICS | Facility: CLINIC | Age: 50
End: 2025-07-16
Payer: COMMERCIAL

## 2025-07-16 DIAGNOSIS — F41.1 ANXIETY STATE: Chronic | ICD-10-CM

## 2025-07-16 RX ORDER — LORAZEPAM 1 MG/1
.5-1 TABLET ORAL EVERY 6 HOURS PRN
Qty: 30 TABLET | Refills: 0 | OUTPATIENT
Start: 2025-07-16

## 2025-08-03 DIAGNOSIS — G25.81 RESTLESS LEG SYNDROME: ICD-10-CM

## 2025-08-03 DIAGNOSIS — K58.0 IRRITABLE BOWEL SYNDROME WITH DIARRHEA: ICD-10-CM

## 2025-08-05 DIAGNOSIS — F51.01 PRIMARY INSOMNIA: ICD-10-CM

## 2025-08-05 RX ORDER — DIPHENOXYLATE HYDROCHLORIDE AND ATROPINE SULFATE 2.5; .025 MG/1; MG/1
1-2 TABLET ORAL 3 TIMES DAILY PRN
Qty: 90 TABLET | Refills: 2 | Status: SHIPPED | OUTPATIENT
Start: 2025-08-05

## 2025-08-05 RX ORDER — TRAZODONE HYDROCHLORIDE 100 MG/1
100 TABLET ORAL AT BEDTIME
Qty: 90 TABLET | Refills: 3 | Status: SHIPPED | OUTPATIENT
Start: 2025-08-05